# Patient Record
Sex: MALE | Race: WHITE | Employment: OTHER | URBAN - METROPOLITAN AREA
[De-identification: names, ages, dates, MRNs, and addresses within clinical notes are randomized per-mention and may not be internally consistent; named-entity substitution may affect disease eponyms.]

---

## 2018-06-20 ENCOUNTER — TELEPHONE (OUTPATIENT)
Dept: ORTHOPEDIC SURGERY | Age: 53
End: 2018-06-20

## 2018-06-20 DIAGNOSIS — S69.91XA INJURY OF RIGHT HAND, INITIAL ENCOUNTER: Primary | ICD-10-CM

## 2018-06-25 ENCOUNTER — OFFICE VISIT (OUTPATIENT)
Dept: ORTHOPEDIC SURGERY | Age: 53
End: 2018-06-25
Payer: MEDICARE

## 2018-06-25 VITALS
SYSTOLIC BLOOD PRESSURE: 124 MMHG | RESPIRATION RATE: 22 BRPM | HEART RATE: 86 BPM | WEIGHT: 200 LBS | DIASTOLIC BLOOD PRESSURE: 84 MMHG | HEIGHT: 72 IN | BODY MASS INDEX: 27.09 KG/M2 | TEMPERATURE: 98.2 F

## 2018-06-25 DIAGNOSIS — M20.021 BOUTONNIERE DEFORMITY OF FINGER OF RIGHT HAND: Primary | ICD-10-CM

## 2018-06-25 PROCEDURE — 99203 OFFICE O/P NEW LOW 30 MIN: CPT | Performed by: ORTHOPAEDIC SURGERY

## 2018-06-25 PROCEDURE — 3017F COLORECTAL CA SCREEN DOC REV: CPT | Performed by: ORTHOPAEDIC SURGERY

## 2018-06-25 PROCEDURE — G8427 DOCREV CUR MEDS BY ELIG CLIN: HCPCS | Performed by: ORTHOPAEDIC SURGERY

## 2018-06-25 PROCEDURE — 1036F TOBACCO NON-USER: CPT | Performed by: ORTHOPAEDIC SURGERY

## 2018-06-25 PROCEDURE — G8419 CALC BMI OUT NRM PARAM NOF/U: HCPCS | Performed by: ORTHOPAEDIC SURGERY

## 2018-06-29 ENCOUNTER — TELEPHONE (OUTPATIENT)
Dept: PHYSICAL THERAPY | Age: 53
End: 2018-06-29

## 2018-07-02 ENCOUNTER — EVALUATION (OUTPATIENT)
Dept: OCCUPATIONAL THERAPY | Age: 53
End: 2018-07-02
Payer: MEDICARE

## 2018-07-02 DIAGNOSIS — M20.021 BOUTONNIERE DEFORMITY OF FINGER OF RIGHT HAND: Primary | ICD-10-CM

## 2018-07-02 PROCEDURE — 97165 OT EVAL LOW COMPLEX 30 MIN: CPT | Performed by: OCCUPATIONAL THERAPIST

## 2018-07-02 PROCEDURE — G8990 OTHER PT/OT CURRENT STATUS: HCPCS | Performed by: OCCUPATIONAL THERAPIST

## 2018-07-02 PROCEDURE — G8991 OTHER PT/OT GOAL STATUS: HCPCS | Performed by: OCCUPATIONAL THERAPIST

## 2018-07-02 NOTE — PROGRESS NOTES
Transportation: car/ drives gerald Castro  Leisure & Hobbies: none reported  Work: on disability     ADL  Feeding: Mod I  Grooming: Mod I  Bathing: Mod I  UE Dressing: Mod I  LE Dressing: Mod I  Toileting: Mod I  Transfer:  I- ambulates with cane    UE Assessment: Hand Dominance is R    R UE AROM: WFL except for the following:  RSF PIP extension: active lacks 65 degrees and passive lacks 50 degrees  RSF DIP flexion: active 0-18 degrees and passive 0-55  RSF DPC: WFL for long fist but lacks -1 cm to palmar crease    R UE strength: Exceptions to WNL     Right: 70# (norm 113#)              Right lateral  Pinch: 5.5# with pain (MP is enlarged)      Sensation: WFL    Tone: Normal    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     Circumferential Measurements: RSF   MP-PIP= 6.4 cm              PIP-DIP= 6.2 cm    R Hand Coordination: WFL    Assessment of current deficits   Functional mobility []  ADLs [] Strength [x]  Cognition []  Functional transfers  [] IADLs [] Safety Awareness []  Endurance []  Fine Motor Coordination [] Balance [] Vision/perception [] Sensation []   Gross Motor Coordination [] ROM [x]  Work []  Leisure[]     Eval Complexity: Low  Profile and History- interview, MD notes  Assessment of Occupational Performance and Identification of Deficits- 2 performance deficits  Clinical Decision Making- no modification required due to the RSF deficit/ co-morbidity affecting stats is left sided weakness/ spastivity    Rehab Potential:   [] Good  [x] Fair  [] Poor   Suggested Professional Referral: [x] No  [] Yes:  Barriers to Goal Achievement[de-identified]   [x] No  [] Yes:  Domestic Concerns:     [x] No  [] Yes:    Goal Formulation: Patient \" To be happy\"  Time In: 1300  Time Out: 1400  Timed Code Treatment Minutes: 60      PLAN     Plan   Plan: Plan of care initiated. Frequency Pt will be seen 2x a week for 5-6 weeks or 10-12 sessions as needed.     Treatment to include:   [x] Instruction in HEP   Modalities:  [x] understanding of HEP/Ed previously given. Patient understands diagnosis/prognosis and consents to treatment, plan and goals: [x] Yes    [] No     Electronically signed by: Wes Burnett OT/JAI  [id number]      [id number] Certification / Comments     Frequency/Duration 2 / week for 12 visits. Certification period From: 7-2-18  To: 8-13-18    I have reviewed the Plan of Care established for skilled therapy services and certify that the services are required and that they will be provided while the patient is under my care. Physician's Comments/Revisions:         Physician's Printed Name:           Dr Lucia Crawford                                Physician's Signature:                                                               Date:       Please review Patient's OT evaluation and if you agree sign/date and fax back to us at our [facility] fax # [phone].

## 2018-07-03 PROBLEM — M20.021 BOUTONNIERE DEFORMITY OF FINGER OF RIGHT HAND: Status: ACTIVE | Noted: 2018-07-03

## 2018-07-06 ENCOUNTER — TREATMENT (OUTPATIENT)
Dept: OCCUPATIONAL THERAPY | Age: 53
End: 2018-07-06
Payer: MEDICARE

## 2018-07-06 DIAGNOSIS — M20.021 BOUTONNIERE DEFORMITY OF FINGER OF RIGHT HAND: Primary | ICD-10-CM

## 2018-07-06 PROCEDURE — 97140 MANUAL THERAPY 1/> REGIONS: CPT | Performed by: OCCUPATIONAL THERAPIST

## 2018-07-06 PROCEDURE — 97110 THERAPEUTIC EXERCISES: CPT | Performed by: OCCUPATIONAL THERAPIST

## 2018-07-06 PROCEDURE — 97760 ORTHOTIC MGMT&TRAING 1ST ENC: CPT | Performed by: OCCUPATIONAL THERAPIST

## 2018-07-09 ENCOUNTER — TREATMENT (OUTPATIENT)
Dept: OCCUPATIONAL THERAPY | Age: 53
End: 2018-07-09
Payer: MEDICARE

## 2018-07-09 DIAGNOSIS — M20.021 BOUTONNIERE DEFORMITY OF FINGER OF RIGHT HAND: Primary | ICD-10-CM

## 2018-07-09 PROCEDURE — 97760 ORTHOTIC MGMT&TRAING 1ST ENC: CPT | Performed by: OCCUPATIONAL THERAPIST

## 2018-07-09 PROCEDURE — 97110 THERAPEUTIC EXERCISES: CPT | Performed by: OCCUPATIONAL THERAPIST

## 2018-07-09 PROCEDURE — 97140 MANUAL THERAPY 1/> REGIONS: CPT | Performed by: OCCUPATIONAL THERAPIST

## 2018-07-11 ENCOUNTER — TREATMENT (OUTPATIENT)
Dept: OCCUPATIONAL THERAPY | Age: 53
End: 2018-07-11
Payer: MEDICARE

## 2018-07-11 DIAGNOSIS — M20.021 BOUTONNIERE DEFORMITY OF FINGER OF RIGHT HAND: Primary | ICD-10-CM

## 2018-07-11 PROCEDURE — 97140 MANUAL THERAPY 1/> REGIONS: CPT | Performed by: OCCUPATIONAL THERAPIST

## 2018-07-11 PROCEDURE — 97110 THERAPEUTIC EXERCISES: CPT | Performed by: OCCUPATIONAL THERAPIST

## 2018-07-17 ENCOUNTER — TREATMENT (OUTPATIENT)
Dept: OCCUPATIONAL THERAPY | Age: 53
End: 2018-07-17
Payer: MEDICARE

## 2018-07-17 DIAGNOSIS — M20.021 BOUTONNIERE DEFORMITY OF FINGER OF RIGHT HAND: Primary | ICD-10-CM

## 2018-07-17 PROCEDURE — 97760 ORTHOTIC MGMT&TRAING 1ST ENC: CPT | Performed by: OCCUPATIONAL THERAPIST

## 2018-07-17 PROCEDURE — 97140 MANUAL THERAPY 1/> REGIONS: CPT | Performed by: OCCUPATIONAL THERAPIST

## 2018-07-17 PROCEDURE — 97110 THERAPEUTIC EXERCISES: CPT | Performed by: OCCUPATIONAL THERAPIST

## 2018-07-20 ENCOUNTER — TREATMENT (OUTPATIENT)
Dept: OCCUPATIONAL THERAPY | Age: 53
End: 2018-07-20
Payer: MEDICARE

## 2018-07-20 DIAGNOSIS — M20.021 BOUTONNIERE DEFORMITY OF FINGER OF RIGHT HAND: Primary | ICD-10-CM

## 2018-07-20 PROCEDURE — 97110 THERAPEUTIC EXERCISES: CPT | Performed by: OCCUPATIONAL THERAPIST

## 2018-07-20 PROCEDURE — 97140 MANUAL THERAPY 1/> REGIONS: CPT | Performed by: OCCUPATIONAL THERAPIST

## 2018-07-22 NOTE — PROGRESS NOTES
Strengthening:               Other:                 Assessment/Comments: Pt is making Fair + progress toward stated plan of care. PROM for R small finger extension has worsened at -40 today. Pt states he has been taking the brace off more. Composite flexion is much better at [facility]. -Rehab Potential: Good  -Requires OT Follow Up: Yes  Time In: 1130        Time Out: 1210        Visit #: 6    Treatment Charges: Mins Units   Modalities: MH 5    Ther Exercise 25 2   Manual Therapy 10 1   Thera Activities     ADL/Home Mgt      Neuro Re-education     Group Therapy     Non-Billable Service Time     Other: splinting     Total Time/Units 40 4     -Response to Treatment:Slow progress continues. Will advance into strengthening and into a LMB splint over the next few session. Tx to continue  For 3 more visits due to pt limitations in home programming due to left hemiparesis and living alone. Goals: Goals for pt can be seen on initial eval occurring on 7-2-18    Plan:   [x]  Continue Plan of care: Pt education continues at each visit to obtain maximum benefits from skilled OT intervention.   []  [facility] of care:   []  Discharge:      Shirlene Ayers OT/L [id number]

## 2018-07-25 ENCOUNTER — TREATMENT (OUTPATIENT)
Dept: OCCUPATIONAL THERAPY | Age: 53
End: 2018-07-25
Payer: MEDICARE

## 2018-07-25 DIAGNOSIS — M20.021 BOUTONNIERE DEFORMITY OF FINGER OF RIGHT HAND: Primary | ICD-10-CM

## 2018-07-25 PROCEDURE — 97110 THERAPEUTIC EXERCISES: CPT | Performed by: OCCUPATIONAL THERAPY ASSISTANT

## 2018-07-25 PROCEDURE — 97140 MANUAL THERAPY 1/> REGIONS: CPT | Performed by: OCCUPATIONAL THERAPY ASSISTANT

## 2018-07-25 NOTE — PROGRESS NOTES
OCCUPATIONAL THERAPY PROGRESS NOTE    Date:  18 Initial Evaluation Date: 18    Patient Name:  Georgine Habermann    :  1965  Restrictions/Precautions: Activity as tolerated, Mild fall risk  Diagnosis:  Right small finger boutonniere deformity                                                             Insurance/Certification information:  Medicare  Referring Physician:  Dr. Lucia Crawford, Arielle Robb [address] [place] [address]  Date of Surgery/Injury: approx 5 months ago  Plan of care signed (Y/N):  Y  Visit# / total visits:    Pain Level: 2 on scale of 1-10, uncomfortable in the right small finger  Subjective: \"I wore my splint for 7 days straight\". Objective:  Updated POC to be completed by visit 10. INTERVENTION: COMPLETED: SPECIFICS/COMMENTS:   Splinting:     Serial splinting R small finger  Pt fit with a custom circumfrential splint to promote a gentle prolonged stretch to the RSF PIP. Digit placed on a gentle stretch during splinting. Material used is light and able to be removed by pt  if needed. Splint removed today/ minor pressure point noted on the dorsal PIP- resolved 15 min after removal   Trial LMB extension splint  Small LMB extension splint attempted x 5 min- increased discoloration present on the dorsal PIP. Will hold on providing at this point. Tube splint application  Pt fit with a new small tube splint to further advance extension at the PIP of the small finger. Flexible metal stay replaced with firm thermoplastic stay. Pt also fit with another new brace so he can keep consistency by using one / cleaning one. AROM:     Blocking ex RSF x DIP and PIP    Curls with dowel x Some assistance required to keep the small finger curled. Better movement at the DIP.     AAROM:     Place and hold RSF x Focus on composite flexion        PROM/Stretching:     PROM and stretches RSF x         Manual techniques:      MFR RSF volar  Jt mobilization/ gentle RSF x         Strengthening: Other:                 Assessment/Comments: Pt is making Fair + progress toward stated plan of care. PROM for R small finger extension has worsened at -40 today. Pt states he has been taking the brace off more. Composite flexion is much better at [facility]. -Rehab Potential: Good  -Requires OT Follow Up: Yes  Time In: 1300        Time Out: 1345       Visit #:7    Treatment Charges: Mins Units   Modalities: MH 5    Ther Exercise 25 2   Manual Therapy 15 1   Thera Activities     ADL/Home Mgt      Neuro Re-education     Group Therapy     Non-Billable Service Time     Other: splinting     Total Time/Units 45 3     -Response to Treatment:Slow progress continues. Will advance into strengthening and into a LMB splint over the next few session. Tx to continue  For 3 more visits due to pt limitations in home programming due to left hemiparesis and living alone. Goals: Goals for pt can be seen on initial eval occurring on 7-2-18    Plan:   [x]  Continue Plan of care: Pt education continues at each visit to obtain maximum benefits from skilled OT intervention.   []  [facility] of care:   []  Discharge:      Andres DURHAM/L [id number]

## 2018-07-26 ENCOUNTER — OFFICE VISIT (OUTPATIENT)
Dept: ORTHOPEDIC SURGERY | Age: 53
End: 2018-07-26
Payer: MEDICARE

## 2018-07-26 VITALS
WEIGHT: 195 LBS | HEIGHT: 72 IN | TEMPERATURE: 98 F | HEART RATE: 84 BPM | SYSTOLIC BLOOD PRESSURE: 127 MMHG | DIASTOLIC BLOOD PRESSURE: 85 MMHG | RESPIRATION RATE: 20 BRPM | BODY MASS INDEX: 26.41 KG/M2

## 2018-07-26 DIAGNOSIS — M20.021 BOUTONNIERE DEFORMITY OF FINGER OF RIGHT HAND: Primary | ICD-10-CM

## 2018-07-26 PROCEDURE — 1036F TOBACCO NON-USER: CPT | Performed by: ORTHOPAEDIC SURGERY

## 2018-07-26 PROCEDURE — G8427 DOCREV CUR MEDS BY ELIG CLIN: HCPCS | Performed by: ORTHOPAEDIC SURGERY

## 2018-07-26 PROCEDURE — 3017F COLORECTAL CA SCREEN DOC REV: CPT | Performed by: ORTHOPAEDIC SURGERY

## 2018-07-26 PROCEDURE — G8419 CALC BMI OUT NRM PARAM NOF/U: HCPCS | Performed by: ORTHOPAEDIC SURGERY

## 2018-07-26 PROCEDURE — 99212 OFFICE O/P EST SF 10 MIN: CPT | Performed by: ORTHOPAEDIC SURGERY

## 2018-07-26 RX ORDER — CEPHALEXIN 500 MG/1
500 CAPSULE ORAL 2 TIMES DAILY
Refills: 0 | COMMUNITY
Start: 2018-07-22 | End: 2018-10-03 | Stop reason: CLARIF

## 2018-07-26 NOTE — PROGRESS NOTES
regards to his left ring finger, discussed it does not look overtly infected today. He has an ingrown nail. Discussed not to trim his nails to short. Okay for soaks. Can follow up after therapy. I have seen and evaluated the patient and agree with the above assessment and plan on today's visit. I have performed the key components of the history and physical examination with significant findings of Improving boutonniere deformity right small finger continue therapy. Follow-up as needed. I concur with the findings and plan as documented.     Dorinda Sheppard MD  7/26/2018

## 2018-08-01 ENCOUNTER — EVALUATION (OUTPATIENT)
Dept: PHYSICAL THERAPY | Age: 53
End: 2018-08-01
Payer: MEDICARE

## 2018-08-01 ENCOUNTER — TREATMENT (OUTPATIENT)
Dept: OCCUPATIONAL THERAPY | Age: 53
End: 2018-08-01
Payer: MEDICARE

## 2018-08-01 DIAGNOSIS — I69.30 HISTORY OF CEREBROVASCULAR ACCIDENT (CVA) WITH RESIDUAL DEFICIT: Primary | ICD-10-CM

## 2018-08-01 DIAGNOSIS — M20.021 BOUTONNIERE DEFORMITY OF FINGER OF RIGHT HAND: Primary | ICD-10-CM

## 2018-08-01 PROCEDURE — 97110 THERAPEUTIC EXERCISES: CPT | Performed by: PHYSICAL THERAPIST

## 2018-08-01 PROCEDURE — 97116 GAIT TRAINING THERAPY: CPT | Performed by: PHYSICAL THERAPIST

## 2018-08-01 PROCEDURE — G8978 MOBILITY CURRENT STATUS: HCPCS | Performed by: PHYSICAL THERAPIST

## 2018-08-01 PROCEDURE — 97140 MANUAL THERAPY 1/> REGIONS: CPT | Performed by: OCCUPATIONAL THERAPIST

## 2018-08-01 PROCEDURE — G8979 MOBILITY GOAL STATUS: HCPCS | Performed by: PHYSICAL THERAPIST

## 2018-08-01 PROCEDURE — 97530 THERAPEUTIC ACTIVITIES: CPT | Performed by: PHYSICAL THERAPIST

## 2018-08-01 PROCEDURE — 97110 THERAPEUTIC EXERCISES: CPT | Performed by: OCCUPATIONAL THERAPIST

## 2018-08-01 PROCEDURE — 97112 NEUROMUSCULAR REEDUCATION: CPT | Performed by: PHYSICAL THERAPIST

## 2018-08-01 PROCEDURE — 97163 PT EVAL HIGH COMPLEX 45 MIN: CPT | Performed by: PHYSICAL THERAPIST

## 2018-08-01 PROCEDURE — 97760 ORTHOTIC MGMT&TRAING 1ST ENC: CPT | Performed by: OCCUPATIONAL THERAPIST

## 2018-08-01 NOTE — PROGRESS NOTES
OCCUPATIONAL THERAPY PROGRESS NOTE    Date:  18 Initial Evaluation Date: 18    Patient Name:  Hayden Adams    :  1965  Restrictions/Precautions: Activity as tolerated, Mild fall risk  Diagnosis:  Right small finger boutonniere deformity                                                             Insurance/Certification information:  Medicare  Referring Physician:  Dr. Jules Hare, Arielle Robb [address] [place] [address]  Date of Surgery/Injury: approx 5 months ago  Plan of care signed (Y/N):  Y  Visit# / total visits:    Pain Level: 1-2 on scale of 1-10, uncomfortable in the right small finger  Subjective: \"I want my finger straight so I wear the splint all the time\". Objective:  Updated POC to be completed by visit 10. INTERVENTION: COMPLETED: SPECIFICS/COMMENTS:   Splinting:     Serial splinting R small finger  Pt fit with a custom circumfrential splint to promote a gentle prolonged stretch to the RSF PIP. Digit placed on a gentle stretch during splinting. Material used is light and able to be removed by pt  if needed. Splint removed today/ minor pressure point noted on the dorsal PIP- resolved 15 min after removal   Trial LMB extension splint x Small LMB extension splint attempted x 5 min- less discoloration present on the dorsal PIP. Will slowly transition into home use. Pt is able to john and doff I. Tube splint application Continue use Pt fit with a new small tube splint to further advance extension at the PIP of the small finger. Flexible metal stay replaced with firm thermoplastic stay. Pt also fit with another new brace so he can keep consistency by using one / cleaning one. AROM:     Blocking ex RSF x DIP and PIP    Curls with dowel x Some assistance required to keep the small finger curled. Better movement at the DIP.     AAROM:     Place and hold RSF x Focus on composite flexion        PROM/Stretching:     PROM and stretches RSF x         Manual techniques:      MFR RSF volar  Jt mobilization/ gentle RSF x         Strengthening:     Putty ex- med soft blend x Gripping, rolling for digit extension- helpful for loosening the RSF PIP joint. Pt to continue this exercise daily at home. Other:                 Assessment/Comments: Pt is making Fair + progress toward stated plan of care. Pt cautioned about wearing his finger braces all day. The need to balance with hand use discussed. Theraputty ex integrated into his home program to promote active ROM and improved strength. AROM in the affected PIP is [place] [place] for flexion and -70 degrees for extension. PROM at the affected PIP is -35 degrees extension.     -Rehab Potential: Good  -Requires OT Follow Up: Yes  Time In: 1250        Time Out: 1345       Visit #:8    Treatment Charges: Mins Units   Modalities: MH 5    Ther Exercise 25 2   Manual Therapy 10 1   Thera Activities     ADL/Home Mgt      Neuro Re-education     Group Therapy     Non-Billable Service Time     Other: splinting 15 1   Total Time/Units 55 4     -Response to Treatment:Slow progress continues. . Tx to continue for 1 more visit due to pt limitations in home programming due to left hemiparesis and living alone. Goals: Goals for pt can be seen on initial eval occurring on 7-2-18    Plan:   [x]  Continue Plan of care: Pt education continues at each visit to obtain maximum benefits from skilled OT intervention.   []  [address] [facility] of care:   []  Discharge:      Joss Marie OT/L  [id number]

## 2018-08-01 NOTE — PROGRESS NOTES
Demonstrates/verbalizes understanding of HEP/Ed previously given. Frequency:  2-3 times per week for 4-6 weeks    Patient understands diagnosis/prognosis and consents to treatment, plan and goals: [x] Yes    [] No     Thank you for the opportunity to work with your patient. If you have questions or comments, please contact me at [phone]; fax: [phone]. Electronically signed by: Mercedez Baez PT         Please sign Physician's Certification and return to: [place] [id number]  Dept: [phone]  Dept Fax: [phone] Certification / Comments     Frequency/Duration 2-3 / week for 4-6 weeks. Certification period From: 8-1-18  To: 9-30-18    I have reviewed the Plan of Care established for skilled therapy services and certify that the services are required and that they will be provided while the patient is under my care.     Physician's Comments/Revisions:               Physician's Printed Name:                                           [de-identified] Signature:                                                               Date:

## 2018-08-03 ENCOUNTER — TREATMENT (OUTPATIENT)
Dept: PHYSICAL THERAPY | Age: 53
End: 2018-08-03
Payer: MEDICARE

## 2018-08-03 ENCOUNTER — TREATMENT (OUTPATIENT)
Dept: OCCUPATIONAL THERAPY | Age: 53
End: 2018-08-03
Payer: MEDICARE

## 2018-08-03 DIAGNOSIS — M20.021 BOUTONNIERE DEFORMITY OF FINGER OF RIGHT HAND: Primary | ICD-10-CM

## 2018-08-03 DIAGNOSIS — I69.30 HISTORY OF CEREBROVASCULAR ACCIDENT (CVA) WITH RESIDUAL DEFICIT: Primary | ICD-10-CM

## 2018-08-03 PROCEDURE — 97110 THERAPEUTIC EXERCISES: CPT | Performed by: OCCUPATIONAL THERAPIST

## 2018-08-03 PROCEDURE — 97110 THERAPEUTIC EXERCISES: CPT | Performed by: PHYSICAL THERAPIST

## 2018-08-03 PROCEDURE — 97140 MANUAL THERAPY 1/> REGIONS: CPT | Performed by: OCCUPATIONAL THERAPIST

## 2018-08-03 NOTE — PROGRESS NOTES
OCCUPATIONAL THERAPY PROGRESS NOTE    Date:  18 Initial Evaluation Date: 18    Patient Name:  Marquis Bullard    :  1965  Restrictions/Precautions: Activity as tolerated, Mild fall risk  Diagnosis:  Right small finger boutonniere deformity                                                             Insurance/Certification information:  Medicare  Referring Physician:  Dr. Jennifer Morin, Arielle Robb [address] [place] [address]  Date of Surgery/Injury: approx 5 months ago  Plan of care signed (Y/N):  Y  Visit# / total visits:    Pain Level: 1-2 on scale of 1-10, uncomfortable in the right small finger  Subjective: \"I want my finger straight so I wear the splint all the time\". Objective:  Updated POC to be completed by visit 10. INTERVENTION: COMPLETED: SPECIFICS/COMMENTS:   Splinting:     Serial splinting R small finger  Pt fit with a custom circumfrential splint to promote a gentle prolonged stretch to the RSF PIP. Digit placed on a gentle stretch during splinting. Material used is light and able to be removed by pt  if needed. Splint removed today/ minor pressure point noted on the dorsal PIP- resolved 15 min after removal    LMB extension splint x Small LMB extension splint  x 5 min and 2x/ day Will increase as tolerated. Pt is able to john and doff I. Tube splint application Continue use Pt fit with a new small tube splint to further advance extension at the PIP of the small finger. Flexible metal stay replaced with firm thermoplastic stay. Pt also fit with another new brace so he can keep consistency by using one / cleaning one. AROM:     Blocking ex RSF x DIP and PIP   Towel exercise x Focus on finger flexion and extension    Curls with dowel x Some assistance required to keep the small finger curled. Better movement at the DIP.     AAROM:     Place and hold RSF x Focus on composite flexion        PROM/Stretching:     PROM and stretches RSF x         Manual techniques:      MFR RSF volar  Jt mobilization/ gentle RSF x         Strengthening:     Putty ex- med soft blend x Gripping, rolling for digit extension- helpful for loosening the RSF PIP joint. Pt to continue this exercise daily at home. Other:                 Assessment/Comments: Pt is making Fair + progress toward stated plan of care. Pt is wearing his splint less with good results. The LMB is being used at 5 min 2x/ day, and the tube splint for nighttime / occasionally during the day. PROM for extension has improved to -25. Compliance with home putty ex is good as well.     -Rehab Potential: Good  -Requires OT Follow Up: Yes  Time In: 1250        Time Out: 1340      Visit #:9    Treatment Charges: Mins Units   Modalities: MH 5    Ther Exercise 30 2   Manual Therapy 15 1   Thera Activities     ADL/Home Mgt      Neuro Re-education     Group Therapy     Non-Billable Service Time     Other: splinting     Total Time/Units 50 3     -Response to Treatment: Slow progress continues. Overall flexibility in the affected finger is better today with less swelling. Therapist agreed to continue OT for 2 more weeks at 1x/ week unless plateau in progress  is noted. Goals: Goals for pt can be seen on initial eval occurring on 7-2-18    Plan:   [x]  Continue Plan of care: Pt education continues at each visit to obtain maximum benefits from skilled OT intervention.   []  [facility] of care:   []  Discharge:      Juan C Recinos OT/L  [id number]

## 2018-08-06 ENCOUNTER — TREATMENT (OUTPATIENT)
Dept: PHYSICAL THERAPY | Age: 53
End: 2018-08-06
Payer: MEDICARE

## 2018-08-06 DIAGNOSIS — I69.30 HISTORY OF CEREBROVASCULAR ACCIDENT (CVA) WITH RESIDUAL DEFICIT: Primary | ICD-10-CM

## 2018-08-06 PROCEDURE — 97110 THERAPEUTIC EXERCISES: CPT | Performed by: PHYSICAL THERAPIST

## 2018-08-08 ENCOUNTER — TREATMENT (OUTPATIENT)
Dept: PHYSICAL THERAPY | Age: 53
End: 2018-08-08
Payer: MEDICARE

## 2018-08-08 ENCOUNTER — TREATMENT (OUTPATIENT)
Dept: OCCUPATIONAL THERAPY | Age: 53
End: 2018-08-08
Payer: MEDICARE

## 2018-08-08 DIAGNOSIS — M20.021 BOUTONNIERE DEFORMITY OF FINGER OF RIGHT HAND: Primary | ICD-10-CM

## 2018-08-08 DIAGNOSIS — I69.30 HISTORY OF CEREBROVASCULAR ACCIDENT (CVA) WITH RESIDUAL DEFICIT: Primary | ICD-10-CM

## 2018-08-08 PROCEDURE — 97110 THERAPEUTIC EXERCISES: CPT

## 2018-08-08 PROCEDURE — G8990 OTHER PT/OT CURRENT STATUS: HCPCS | Performed by: OCCUPATIONAL THERAPIST

## 2018-08-08 PROCEDURE — 97112 NEUROMUSCULAR REEDUCATION: CPT

## 2018-08-08 PROCEDURE — 97110 THERAPEUTIC EXERCISES: CPT | Performed by: OCCUPATIONAL THERAPIST

## 2018-08-08 PROCEDURE — 97140 MANUAL THERAPY 1/> REGIONS: CPT | Performed by: OCCUPATIONAL THERAPIST

## 2018-08-08 PROCEDURE — G8991 OTHER PT/OT GOAL STATUS: HCPCS | Performed by: OCCUPATIONAL THERAPIST

## 2018-08-08 NOTE — PROGRESS NOTES
OCCUPATIONAL THERAPY PROGRESS NOTE    Date:  18 Initial Evaluation Date: 18    Patient Name:  Marquis Bullard    :  1965  Restrictions/Precautions: Activity as tolerated, Mild fall risk  Diagnosis:  Right small finger boutonniere deformity                                                             Insurance/Certification information:  Medicare  Referring Physician:  Dr. Jennifer Morin, Arielle Robb [address] [place] [address]  Date of Surgery/Injury: approx 5 months ago  Plan of care signed (Y/N):  Y  Visit# / total visits:10 / 12    Pain Level: 1-2 on scale of 1-10, uncomfortable in the right small finger  Subjective: \"I want my finger straight so I wear the splint all the time\". Objective:  Updated POC to be completed by visit 10. INTERVENTION: COMPLETED: SPECIFICS/COMMENTS:   Splinting:     Serial splinting R small finger  Pt fit with a custom circumfrential splint to promote a gentle prolonged stretch to the RSF PIP. Digit placed on a gentle stretch during splinting. Material used is light and able to be removed by pt  if needed. Splint removed today/ minor pressure point noted on the dorsal PIP- resolved 15 min after removal    LMB extension splint x Small LMB extension splint  x 5 min and 2x/ day Will increase as tolerated. Pt is able to john and doff I. Tube splint application Continue use  4 hours day/ night Pt fit with a new small tube splint to further advance extension at the PIP of the small finger. Flexible metal stay replaced with firm thermoplastic stay. Pt also fit with another new brace so he can keep consistency by using one / cleaning one. AROM:     Blocking ex RSF x DIP and PIP   Towel exercise  Focus on finger flexion and extension    Curls with dowel x Less assistance required to keep the small finger curled. Better movement at the DIP.     AAROM:     Place and hold RSF x Focus on composite flexion        PROM/Stretching:     PROM and stretches RSF x         Manual techniques:      MFR RSF volar  Jt mobilization/ gentle RSF x         Strengthening:     Putty ex- med soft blend x Gripping, rolling for digit extension- helpful for loosening the RSF PIP joint. Pt to continue this exercise daily at home. Other:                 Assessment/Comments: Pt is making Fair + progress toward stated plan of care. -Rehab Potential: Good  -Requires OT Follow Up: Yes  Time In: 1300      Time Out: 1350      Visit # 10    Treatment Charges: Mins Units   Modalities: MH 5    Ther Exercise 30 2   Manual Therapy 15 1   Thera Activities     ADL/Home Mgt      Neuro Re-education     Group Therapy     Non-Billable Service Time     Other: splinting     Total Time/Units 50 3     -Response to Treatment: Slow progress continues. Therapist agreed to continue OT for 1-2 more weeks at 1x/ week unless plateau in progress  is noted. G Code: AROM RSF (% of normal)  Adm: CL (76% impairment)  Current: CK (50%)  Goal: CJ (20-40%)   Goals: Goals for pt can be seen on initial eval occurring on 7-2-18    Plan:   [x]  Continue Plan of care: Pt education continues at each visit to obtain maximum benefits from skilled OT intervention.   []  [facility] of care:   []  Discharge:      Dailey Re OT/L  [id number]

## 2018-08-09 NOTE — PROGRESS NOTES
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 Related impairments: ADL's  Social: lives alone in an apartment, drives a handicapped [place] [employer]  Chief complaint: walking worsened 10/18, states this was a gradual process with stiffening of the left side                  Outcome Measure:      S: no new complaints  O:  Time [id number]     Visit 4     Pain 0/10     ROM      Modalities            Exercise      Nustep   10 min           Squats      Calf Raises 3 x 15            Marching 3 x 15     Alt. Sidekicks 3 x 15     Step up 3 x 5     Sit/Stands            Gait training       NMR Balance activities to aid in safe community and home ambulation    Marching Gait      Sidestepping      Retrowalk      Heel to toe      March on BOSU                  A:  Tolerated well. Above added to written HEP.   P: Continue with rehab plan  Jone Patterson, PT    Treatment Charges: Mins Units   Initial Evaluation     Re-Evaluation     Ther Exercise         TE 55 4   Manual Therapy     MT     Ther Activities        TA     Gait Training          GT     Neuro Re-education NR     Modalities     Non-Billable Service Time     Other     Total Time/Units 55 4

## 2018-08-13 ENCOUNTER — TREATMENT (OUTPATIENT)
Dept: PHYSICAL THERAPY | Age: 53
End: 2018-08-13
Payer: MEDICARE

## 2018-08-13 DIAGNOSIS — I69.30 HISTORY OF CEREBROVASCULAR ACCIDENT (CVA) WITH RESIDUAL DEFICIT: Primary | ICD-10-CM

## 2018-08-13 PROCEDURE — 97112 NEUROMUSCULAR REEDUCATION: CPT

## 2018-08-13 PROCEDURE — 97110 THERAPEUTIC EXERCISES: CPT

## 2018-08-15 ENCOUNTER — TREATMENT (OUTPATIENT)
Dept: OCCUPATIONAL THERAPY | Age: 53
End: 2018-08-15
Payer: MEDICARE

## 2018-08-15 ENCOUNTER — TREATMENT (OUTPATIENT)
Dept: PHYSICAL THERAPY | Age: 53
End: 2018-08-15
Payer: MEDICARE

## 2018-08-15 DIAGNOSIS — I69.30 HISTORY OF CEREBROVASCULAR ACCIDENT (CVA) WITH RESIDUAL DEFICIT: Primary | ICD-10-CM

## 2018-08-15 DIAGNOSIS — M20.021 BOUTONNIERE DEFORMITY OF FINGER OF RIGHT HAND: Primary | ICD-10-CM

## 2018-08-15 PROCEDURE — 97140 MANUAL THERAPY 1/> REGIONS: CPT | Performed by: OCCUPATIONAL THERAPIST

## 2018-08-15 PROCEDURE — G8991 OTHER PT/OT GOAL STATUS: HCPCS | Performed by: OCCUPATIONAL THERAPIST

## 2018-08-15 PROCEDURE — 97112 NEUROMUSCULAR REEDUCATION: CPT

## 2018-08-15 PROCEDURE — 97110 THERAPEUTIC EXERCISES: CPT | Performed by: OCCUPATIONAL THERAPIST

## 2018-08-15 PROCEDURE — 97110 THERAPEUTIC EXERCISES: CPT

## 2018-08-15 PROCEDURE — G8992 OTHER PT/OT  D/C STATUS: HCPCS | Performed by: OCCUPATIONAL THERAPIST

## 2018-08-15 NOTE — PROGRESS NOTES
techniques:      MFR RSF volar  Jt mobilization/ gentle RSF x         Strengthening:     Putty ex- med soft blend x Gripping, rolling for digit extension- helpful for loosening the RSF PIP joint. Pt to continue this exercise daily at home. Other:                 Assessment/Comments: Pt is making Fair + progress toward stated plan of care. AROM in the RSF is WNL for active finger flexion. Residual limitations are present for PIP extension only. (PIP active extension -65 degrees and PROM -30 degrees) This is a plateau in ROM progress. GOALS (Long term same as Short term):  1) Patient will demonstrate good understanding of home program(exercises/activities/diagnosis/prognosis/goals) with good accuracy. ( goal met for ROM and hand strengthening)    2) Patient will demonstrate increased active/passive range of motion of their R small finger to [facility] for ADL/IADL completion.(goal met)  AROM in the R hand is [employer] [facility] [place] with residual extension limitations at the RSF PIP joint. 3) Patient will demonstrate increased /pinch strength of at least 10 / 5 pinch pounds of their R hand. (progress noted)  R hand strength is [employer] [facility] [place] for daily activity. 4) Patient to report decreased pain in their affected R hand from 4/10 to 1/10 or less with resistive functional use. (max progress)    5) Patient to report 100% compliance with their splint wear, care, and precautions if needed. (progresss noted)  Splint use is >70% compliance.     6) Prevent further deformity and maximize ROM in the R small finger (goal met)    -Rehab Potential: Good  -Requires OT Follow Up: Yes  Time In: 1305      Time Out: 1355     Visit # 10    Treatment Charges: Mins Units   Modalities: MH 5    Ther Exercise 30 2   Manual Therapy 15 1   Thera Activities     ADL/Home Mgt      Neuro Re-education     Group Therapy     Non-Billable Service Time     Other: splinting     Total Time/Units 50 3     G Code: AROM RSF (% of normal)  Adm: CL (76% impairment)  Discharge:  CK (50%)  Goal: CJ (20-40%)   Goals: Goals for pt can be seen on initial eval occurring on 7-2-18    Plan:   []  Continue Plan of care: Pt education continues at each visit to obtain maximum benefits from skilled OT intervention.   []  Alter Plan of care:   [x]  Discharge:Due to plateau in progress/ pt to continue splint use      Sesar Meyer OT/L  [id number]

## 2018-08-15 NOTE — PROGRESS NOTES
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 Related impairments: ADL's  Social: lives alone in an apartment, drives a handicapped Jessica Zarco  Chief complaint: walking worsened 10/18, states this was a gradual process with stiffening of the left side                  Outcome Measure:      S:  Pt reports doing ok. Continues to be sore and tired but feels good  O:  Time 4596-4292     Visit 6     Pain 0/10     ROM      Modalities            Exercise      Nustep   L5/ 10 min  TE         Squats      Calf Raises 3 x 15   Circuit NM          Marching 3 x 15       \" NM   Alt. Sidekicks 3 x 15       \" NM   Step up 8\" 2 x 10  NM   Sit/Stands 10x, 15x  1 hand to stand, no hand to sit down NM   Leg press 40#  40x  NM   Leg press       SL 0#   7x, 9x, 10x Support left leg NM   Gait training       NMR Balance activities to aid in safe community and home ambulation    Marching Gait      Sidestepping      Retrowalk      Heel to toe      March on BOSU                  A:  Tolerated well. Above added to written HEP.   P: Continue with rehab plan  Chas Antonio PTA    Treatment Charges: Mins Units   Initial Evaluation     Re-Evaluation     Ther Exercise         TE 15 1   Manual Therapy     MT     Ther Activities        TA     Gait Training          GT     Neuro Re-education NR 45 3   Modalities     Non-Billable Service Time     Other     Total Time/Units 60 4

## 2018-08-20 ENCOUNTER — TREATMENT (OUTPATIENT)
Dept: PHYSICAL THERAPY | Age: 53
End: 2018-08-20
Payer: MEDICARE

## 2018-08-20 DIAGNOSIS — I69.30 HISTORY OF CEREBROVASCULAR ACCIDENT (CVA) WITH RESIDUAL DEFICIT: Primary | ICD-10-CM

## 2018-08-20 PROCEDURE — 97110 THERAPEUTIC EXERCISES: CPT | Performed by: PHYSICAL THERAPIST

## 2018-08-20 PROCEDURE — 97112 NEUROMUSCULAR REEDUCATION: CPT | Performed by: PHYSICAL THERAPIST

## 2018-08-20 NOTE — PROGRESS NOTES
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 Related impairments: ADL's  Social: lives alone in an apartment, drives a handicapped [employer]  Chief complaint: walking worsened 10/18, states this was a gradual process with stiffening of the left side                  Outcome Measure:      S:  Pt reports he was able to walk 7 laps in [place] w/ minimal difficulty. States he could do 4 laps before w/ marked difficulty   O:  Time 4679-7304     Visit 8     Pain 0/10     ROM      Modalities            Exercise      Nustep   L5/ 10 min  TE         Squats      Calf Raises 3 x 15   Circuit NM          Marching 3 x 15       \" NM   Alt. Sidekicks 3 x 15       \" NM   Step up 8\" 2 x 10  NM   Sit/Stands 10x, 15x  1 hand to stand, no hand to sit down NM   Leg press 60#  2 x 20  NM   Leg press       SL 20#   2 x 10 Support left leg NM   Gait training       NMR Balance activities to aid in safe community and home ambulation    Marching Gait      Sidestepping      Retrowalk      Heel to toe      March on BOSU                  A:  Tolerated well. Above added to written HEP.   P: Continue with rehab plan  Vidal Grider PT    Treatment Charges: Mins Units   Initial Evaluation     Re-Evaluation     Ther Exercise         TE 15 1   Manual Therapy     MT     Ther Activities        TA     Gait Training          GT     Neuro Re-education NR 40 3   Modalities     Non-Billable Service Time     Other     Total Time/Units 55 4

## 2018-08-22 ENCOUNTER — TREATMENT (OUTPATIENT)
Dept: PHYSICAL THERAPY | Age: 53
End: 2018-08-22
Payer: MEDICARE

## 2018-08-22 ENCOUNTER — EVALUATION (OUTPATIENT)
Dept: OCCUPATIONAL THERAPY | Age: 53
End: 2018-08-22
Payer: MEDICARE

## 2018-08-22 DIAGNOSIS — I69.359 CVA, OLD, HEMIPARESIS (HCC): Primary | ICD-10-CM

## 2018-08-22 DIAGNOSIS — I69.30 HISTORY OF CEREBROVASCULAR ACCIDENT (CVA) WITH RESIDUAL DEFICIT: Primary | ICD-10-CM

## 2018-08-22 PROCEDURE — 97112 NEUROMUSCULAR REEDUCATION: CPT

## 2018-08-22 PROCEDURE — 97110 THERAPEUTIC EXERCISES: CPT

## 2018-08-22 PROCEDURE — G8991 OTHER PT/OT GOAL STATUS: HCPCS | Performed by: OCCUPATIONAL THERAPIST

## 2018-08-22 PROCEDURE — 97167 OT EVAL HIGH COMPLEX 60 MIN: CPT | Performed by: OCCUPATIONAL THERAPIST

## 2018-08-22 PROCEDURE — G8990 OTHER PT/OT CURRENT STATUS: HCPCS | Performed by: OCCUPATIONAL THERAPIST

## 2018-08-22 NOTE — PROGRESS NOTES
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 Related impairments: ADL's  Social: lives alone in an apartment, drives a handicapped [employer]  Chief complaint: walking worsened 10/18, states this was a gradual process with stiffening of the left side                  Outcome Measure:      S:  Pt reports doing ok. Continues to be sore and tired but feels good  O:  Time 6425-9129     Visit 8     Pain 0/10     ROM      Modalities            Exercise      Nustep   L5/ 10 min  TE         Squats      Calf Raises 3 x 15   Circuit     Airex NM          Marching 3 x 15       \" NM   Alt. Sidekicks 3 x 15       \" NM   Step up   NM   Sit/Stands 15x  1 hand to stand, no hand to sit down NM   Leg press 40#  40x  NM   Leg press       SL 20# 2 x 10 Support left leg NM   Gait training       NMR Balance activities to aid in safe community and home ambulation    Marching Gait      Sidestepping      Retrowalk      Heel to toe      March on BOSU                  A:  Tolerated well. Above added to written HEP.   P: Continue with rehab plan  Blanka Hurst PTA    Treatment Charges: Mins Units   Initial Evaluation     Re-Evaluation     Ther Exercise         TE 15 1   Manual Therapy     MT     Ther Activities        TA     Gait Training          GT     Neuro Re-education NR 45 3   Modalities     Non-Billable Service Time     Other     Total Time/Units 60 4

## 2018-08-23 PROBLEM — I69.359 CVA, OLD, HEMIPARESIS (HCC): Status: ACTIVE | Noted: 2018-08-23

## 2018-08-23 NOTE — PROGRESS NOTES
OCCUPATIONAL THERAPY INITIAL EVALUATION    Phone: [phone]  Fax: [phone]     Date:  2018  Initial Evaluation Date: 18    Patient Name:  Tres Hampton    :  1965    Restrictions/Precautions: Activity as tolerated, Mild fall risk  Diagnosis:  CVA with left sided weakness       Insurance/Certification information:  Medicare  Referring Physician:  Dr Arlene Holguin, [address] [place] [address]  Date of Surgery/Injury: CVA 2011  Plan of care signed (Y/N):  N  Visit# / total visits:     Past Medical History:   Past Medical History:   Diagnosis Date    Blindness     right eye    Diverticulitis 2016    Hyperlipidemia     Hypertension     Stroke [facility])     left sided weakness     Past Surgical History:   Past Surgical History:   Procedure Laterality Date    BRAIN SURGERY      removed part of bone    CHOLECYSTECTOMY  2013    Laparoscopic     Reason for Referral: patient presents for outpatient Occupational therapy following CVA in . Pt states following his CVA he went to [place] for rehabilitation for about 6 months. Since that time, he hasn't had any more therapy. His main concerns today are progressive deformity in the left arm and decreased ability to use the arm as an assist.   Comments: At rest, patient holds the left arm in a flexor synergy with extreme wrist flexion. The states he has a resting splint for the hand/ wrist, but states it doesn't fit any more. Home Living: lives alone in apartment with 6-10 step entry. He has a hand held shower, tub seat, tub grabbars, [address], quad cane and power chair.    Prior Level of Function: Independent    Pain Level: moderate, squeezing, tight (pulling) and uncomfortable in the left shoulder    Cognition:   Alert/Oriented x3     IADL History  Homemaking Responsibility: I  Shopping Responsibility: I  Mode of Transportation: car  Leisure & Hobbies: none reported  Work: / on disability now     ADL ( all CN  Goal: CL    Patient. Education:  [x] Plans/Goals, Risks/Benefits discussed  [] Home exercise program  Method of Education: [x] Verbal  [] Demo  [] Written  Comprehension of Education:  [x] Verbalizes understanding. [] Demonstrates understanding. [] Needs Review. [] Demonstrates/verbalizes understanding of HEP/Ed previously given. Patient understands diagnosis/prognosis and consents to treatment, plan and goals: [x] Yes    [] No     Electronically signed by: Maria Elena Forbes OT/L  [id number]      [id number] Certification / Comments     Frequency/Duration 2 / week for 12 visits. Certification period From: 8-23-18  To: 10-4-18    I have reviewed the Plan of Care established for skilled therapy services and certify that the services are required and that they will be provided while the patient is under my care. Physician's Comments/Revisions:         Physician's Printed Name:       Dr Cathy Aburto                                    Physician's Signature:                                                               Date:       Please review Patient's OT evaluation and if you agree sign/date and fax back to us at our [facility] fax # [phone].

## 2018-08-29 ENCOUNTER — TREATMENT (OUTPATIENT)
Dept: PHYSICAL THERAPY | Age: 53
End: 2018-08-29
Payer: MEDICARE

## 2018-08-29 ENCOUNTER — TREATMENT (OUTPATIENT)
Dept: OCCUPATIONAL THERAPY | Age: 53
End: 2018-08-29
Payer: MEDICARE

## 2018-08-29 DIAGNOSIS — I69.359 CVA, OLD, HEMIPARESIS (HCC): Primary | ICD-10-CM

## 2018-08-29 DIAGNOSIS — I69.30 HISTORY OF CEREBROVASCULAR ACCIDENT (CVA) WITH RESIDUAL DEFICIT: Primary | ICD-10-CM

## 2018-08-29 PROCEDURE — 97110 THERAPEUTIC EXERCISES: CPT | Performed by: OCCUPATIONAL THERAPIST

## 2018-08-29 PROCEDURE — 97110 THERAPEUTIC EXERCISES: CPT

## 2018-08-29 PROCEDURE — 97112 NEUROMUSCULAR REEDUCATION: CPT

## 2018-08-29 PROCEDURE — 97112 NEUROMUSCULAR REEDUCATION: CPT | Performed by: OCCUPATIONAL THERAPIST

## 2018-08-29 NOTE — PROGRESS NOTES
OCCUPATIONAL THERAPY PROGRESS NOTE    Date:  2018  Initial Evaluation Date: 18    Patient Name:  Ana Carlson    :  1965  Restrictions/Precautions: Activity as tolerated, Mild fall risk  Diagnosis:  CVA with left sided weakness                                                      Insurance/Certification information:  Medicare  Referring Physician:  Dr Jael Parikh, [address] [place] [address]  Date of Surgery/Injury: CVA 2011  Plan of care signed (Y/N):  N  Visit# / total visits:     Pain Level: 5 on scale of 1-10, tight (pulling) and uncomfortable  In the L shoulder  Subjective: The patient presents for Occupational Therapy with no new complaints. Objective:  Updated POC to be completed by visit 10. INTERVENTION: COMPLETED: SPECIFICS/COMMENTS:   Modality:     MH x L shld in conjunction with ROM to the L distal extremity 10 min  Tolerated well        AROM/ AAROM     Tenodesis x Very challenging/ encouraged for home   Shld/ scapular AROM x    L UE reach x Requires moderate effort        PROM/Stretching:     PROM L UE including scapula to tolerance x Guarded of PROM- c/o pulling sensation with shld ROM- full digit extension noted with wrist flexion but compromised with wrist in neutral        Neuro- muscular Re-education     Postural control x Focus on maintaining posture with ROM ex/ requires tactile and verbal cues   Inhibition techniques x  to decrease spasticity in the left shld , elbow and wrist   Strengthening:               Other:                 Assessment/Comments: Pt is making Fair progress toward stated plan of care.    -Rehab Potential: Good  -Requires OT Follow Up: Yes  Time In:1300            Time Out: 1400             Visit #: 2    Treatment Charges: Mins Units   Modalities     Ther Exercise 40 3   Manual Therapy     Thera Activities     ADL/Home Mgt      Neuro Re-education 20 1   Group Therapy     Non-Billable Service Time     Other     Total Time/Units 60

## 2018-08-29 NOTE — PROGRESS NOTES
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 Related impairments: ADL's  Social: lives alone in an apartment, drives a handicapped [employer]  Chief complaint: walking worsened 10/18, states this was a gradual process with stiffening of the left side                  Outcome Measure:      S:  Pt reports doing ok. Continues to be sore and tired but feels good  O:  Time 0428-5321     Visit 9     Pain 0/10     ROM      Modalities            Exercise      Nustep   L5/ 15 min  TE         Squats      Calf Raises   Circuit     Airex NM         Marching       \" NM   Alt. Sidekicks       \" NM   Step up   NM   Sit/Stands 20x  1 hand to stand, no hand to sit down NM   Leg press 40#  20x  NM   Leg press       SL 20# 2 x 20 Support left leg NM   Gait training       NMR Balance activities to aid in safe community and home ambulation    Marching Gait      Sidestepping      Retrowalk      Heel to toe      March on BOSU            Leg ext SL 5#  2 x 10  B LE 10# 2 x 10                       A:  Tolerated well.     P: Continue with rehab plan  Annette Culp PTA    Treatment Charges: Mins Units   Initial Evaluation     Re-Evaluation     Ther Exercise         TE 15 1   Manual Therapy     MT     Ther Activities        TA     Gait Training          GT     Neuro Re-education NR 45 3   Modalities     Non-Billable Service Time     Other     Total Time/Units 60 4

## 2018-08-31 ENCOUNTER — TREATMENT (OUTPATIENT)
Dept: OCCUPATIONAL THERAPY | Age: 53
End: 2018-08-31
Payer: MEDICARE

## 2018-08-31 ENCOUNTER — TREATMENT (OUTPATIENT)
Dept: PHYSICAL THERAPY | Age: 53
End: 2018-08-31
Payer: MEDICARE

## 2018-08-31 DIAGNOSIS — M20.021 BOUTONNIERE DEFORMITY OF FINGER OF RIGHT HAND: ICD-10-CM

## 2018-08-31 DIAGNOSIS — I69.359 CVA, OLD, HEMIPARESIS (HCC): Primary | ICD-10-CM

## 2018-08-31 DIAGNOSIS — I69.30 HISTORY OF CEREBROVASCULAR ACCIDENT (CVA) WITH RESIDUAL DEFICIT: Primary | ICD-10-CM

## 2018-08-31 PROCEDURE — 97110 THERAPEUTIC EXERCISES: CPT

## 2018-08-31 PROCEDURE — 97110 THERAPEUTIC EXERCISES: CPT | Performed by: OCCUPATIONAL THERAPIST

## 2018-08-31 PROCEDURE — 97112 NEUROMUSCULAR REEDUCATION: CPT | Performed by: OCCUPATIONAL THERAPIST

## 2018-08-31 PROCEDURE — 97112 NEUROMUSCULAR REEDUCATION: CPT

## 2018-08-31 NOTE — PROGRESS NOTES
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 Related impairments: ADL's  Social: lives alone in an apartment, drives a handicapped [employer]  Chief complaint: walking worsened 10/18, states this was a gradual process with stiffening of the left side                  Outcome Measure:Functional Limitation: Mobility: Walking and moving around  Mobility: Walking and Moving Around Current Status (): At least 40 percent but less than 60 percent impaired, limited or restricted  Mobility: Walking and Moving Around Goal Status (): At least 20 percent but less than 40 percent impaired, limited or restricted      S:  Pt reports doing ok. Continues to be sore and tired but feels good   O:  Time 8662-4740     Visit 10     Pain 0/10     ROM      Modalities            Exercise      Nustep   L5/ 15 min  TE         Squats      Calf Raises   Circuit     Airex NM         Marching       \" NM   Alt. Sidekicks       \" NM   Step up   NM   Sit/Stands 20x  1 hand to stand, no hand to sit down NM   Leg press 40#  20x  NM   Leg press       SL 20# 2 x 20 Support left leg NM   Gait training       NMR Balance activities to aid in safe community and home ambulation    Marching Gait      Sidestepping      Retrowalk      Heel to toe      March on BOSU            Leg ext SL 5#  2 x 10  B LE 10# 2 x 10                       A:  Tolerated well.     P: Continue with rehab plan  Brien Tay PTA    Treatment Charges: Mins Units   Initial Evaluation     Re-Evaluation     Ther Exercise         TE 15 1   Manual Therapy     MT     Ther Activities        TA     Gait Training GT     Neuro Re-education NR 45 3   Modalities     Non-Billable Service Time     Other     Total Time/Units 60 4

## 2018-09-05 ENCOUNTER — TREATMENT (OUTPATIENT)
Dept: OCCUPATIONAL THERAPY | Age: 53
End: 2018-09-05
Payer: MEDICARE

## 2018-09-05 ENCOUNTER — TREATMENT (OUTPATIENT)
Dept: PHYSICAL THERAPY | Age: 53
End: 2018-09-05
Payer: MEDICARE

## 2018-09-05 DIAGNOSIS — I69.359 CVA, OLD, HEMIPARESIS (HCC): Primary | ICD-10-CM

## 2018-09-05 DIAGNOSIS — I69.30 HISTORY OF CEREBROVASCULAR ACCIDENT (CVA) WITH RESIDUAL DEFICIT: Primary | ICD-10-CM

## 2018-09-05 PROCEDURE — 97112 NEUROMUSCULAR REEDUCATION: CPT

## 2018-09-05 PROCEDURE — 97110 THERAPEUTIC EXERCISES: CPT | Performed by: OCCUPATIONAL THERAPIST

## 2018-09-05 PROCEDURE — 97112 NEUROMUSCULAR REEDUCATION: CPT | Performed by: OCCUPATIONAL THERAPIST

## 2018-09-05 PROCEDURE — 97110 THERAPEUTIC EXERCISES: CPT

## 2018-09-05 NOTE — PROGRESS NOTES
Potential: Good  -Requires OT Follow Up: Yes  Time In:1300           Time Out: 1400            Visit #: 3    Treatment Charges: Mins Units   Modalities     Ther Exercise 30 2   Manual Therapy     Thera Activities     ADL/Home Mgt      Neuro Re-education 30 2   Group Therapy     Non-Billable Service Time     Other     Total Time/Units 60 4     -Response to Treatment: Tolerance for activity is better today than his last session. Intermittent left arm spasticity remains an issue, especially in the wrist flexors and elbow flexors. Time taken to discuss the possibility of a physiatry consult for potential botox injections with Dr Katarzyna Florez. Pt states he will think about it. Wrist race tolerated well for 1 1/2 hours. Will advance in splint tolerance as pt can tolerate. Goals: Goals for pt can be seen on initial eval occurring on 8-22-18    Plan:   [x]  Continue Plan of care: Pt education continues at each visit to obtain maximum benefits from skilled OT intervention.   []  [address] of care:   []  Discharge:      [address] OT/L   [id number]

## 2018-09-07 ENCOUNTER — TREATMENT (OUTPATIENT)
Dept: PHYSICAL THERAPY | Age: 53
End: 2018-09-07
Payer: MEDICARE

## 2018-09-07 ENCOUNTER — TREATMENT (OUTPATIENT)
Dept: OCCUPATIONAL THERAPY | Age: 53
End: 2018-09-07
Payer: MEDICARE

## 2018-09-07 DIAGNOSIS — M20.021 BOUTONNIERE DEFORMITY OF FINGER OF RIGHT HAND: Primary | ICD-10-CM

## 2018-09-07 DIAGNOSIS — I69.30 HISTORY OF CEREBROVASCULAR ACCIDENT (CVA) WITH RESIDUAL DEFICIT: Primary | ICD-10-CM

## 2018-09-07 PROCEDURE — 97110 THERAPEUTIC EXERCISES: CPT | Performed by: OCCUPATIONAL THERAPIST

## 2018-09-07 PROCEDURE — 97112 NEUROMUSCULAR REEDUCATION: CPT | Performed by: OCCUPATIONAL THERAPIST

## 2018-09-07 PROCEDURE — 97110 THERAPEUTIC EXERCISES: CPT

## 2018-09-07 PROCEDURE — 97112 NEUROMUSCULAR REEDUCATION: CPT

## 2018-09-07 NOTE — PROGRESS NOTES
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 Related impairments: ADL's  Social: lives alone in an apartment, drives a handicapped [employer]  Chief complaint: walking worsened 10/18, states this was a gradual process with stiffening of the left side                  Outcome Measure:Functional Limitation: Mobility: Walking and moving around  Mobility: Walking and Moving Around Current Status (): At least 40 percent but less than 60 percent impaired, limited or restricted  Mobility: Walking and Moving Around Goal Status (): At least 20 percent but less than 40 percent impaired, limited or restricted      S:  Pt reports doing ok. O:  Time 8537-4005     Visit 12     Pain 0/10     ROM      Modalities            Exercise      Nustep   L5/ 15 min  TE         Squats      Calf Raises 3 x 15   Circuit     Airex NM          Marching 3 x 15       \" NM   Alt. Sidekicks 3 x 15       \" NM   Step up   NM   Sit/Stands 20x  1 hand to stand, no hand to sit down NM   Leg press 40#  2 x 20  NM   Leg press       SL 20# 2 x 20 Support left leg NM   Gait training       NMR Balance activities to aid in safe community and home ambulation    Marching Gait      Sidestepping      Retrowalk      Heel to toe      March on BOSU            Leg ext B LE 10# 2 x 10  SL 5#  2 x 10                       A:  Tolerated well.   Pt was able to perform 1 sit to stand with no arm and 10% asst from PTA  P: Continue with rehab plan  Megan Reilly PTA    Treatment Charges: Mins Units   Initial Evaluation     Re-Evaluation     Ther Exercise         TE 15 1   Manual Therapy     MT Ther Activities        TA     Gait Training          GT     Neuro Re-education NR 45 3   Modalities     Non-Billable Service Time     Other     Total Time/Units 60 4

## 2018-09-12 ENCOUNTER — TELEPHONE (OUTPATIENT)
Dept: PHYSICAL MEDICINE AND REHAB | Age: 53
End: 2018-09-12

## 2018-09-12 ENCOUNTER — TREATMENT (OUTPATIENT)
Dept: PHYSICAL THERAPY | Age: 53
End: 2018-09-12
Payer: MEDICARE

## 2018-09-12 ENCOUNTER — TREATMENT (OUTPATIENT)
Dept: OCCUPATIONAL THERAPY | Age: 53
End: 2018-09-12
Payer: MEDICARE

## 2018-09-12 DIAGNOSIS — I69.359 CVA, OLD, HEMIPARESIS (HCC): Primary | ICD-10-CM

## 2018-09-12 DIAGNOSIS — I69.30 HISTORY OF CEREBROVASCULAR ACCIDENT (CVA) WITH RESIDUAL DEFICIT: Primary | ICD-10-CM

## 2018-09-12 PROCEDURE — 97530 THERAPEUTIC ACTIVITIES: CPT | Performed by: OCCUPATIONAL THERAPIST

## 2018-09-12 PROCEDURE — 97110 THERAPEUTIC EXERCISES: CPT

## 2018-09-12 PROCEDURE — 97112 NEUROMUSCULAR REEDUCATION: CPT

## 2018-09-12 PROCEDURE — 97112 NEUROMUSCULAR REEDUCATION: CPT | Performed by: OCCUPATIONAL THERAPIST

## 2018-09-12 PROCEDURE — 97110 THERAPEUTIC EXERCISES: CPT | Performed by: OCCUPATIONAL THERAPIST

## 2018-09-12 NOTE — PROGRESS NOTES
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 Related impairments: ADL's  Social: lives alone in an apartment, drives a handicapped [employer]  Chief complaint: walking worsened 10/18, states this was a gradual process with stiffening of the left side                  Outcome Measure:Functional Limitation: Mobility: Walking and moving around  Mobility: Walking and Moving Around Current Status (): At least 40 percent but less than 60 percent impaired, limited or restricted  Mobility: Walking and Moving Around Goal Status (): At least 20 percent but less than 40 percent impaired, limited or restricted      S:  Pt reports doing ok. O:  Time 1933-9432     Visit 13     Pain 0/10     ROM      Modalities            Exercise      Nustep   L5/ 15 min  TE         Squats      Calf Raises 3 x 15   Circuit     Airex NM          Marching 3 x 15       \" NM   Alt. Sidekicks 3 x 15       \" NM   Step up 8\" 2 x 10  NM   Sit/Stands 20x/ 4 min  1 hand to stand, no hand to sit down. SBA/CGA hold chair NM   Leg press 40#  2 x 20  NM   Leg press       SL 20# 2 x 20 Support left leg NM   Gait training       NMR Balance activities to aid in safe community and home ambulation    Marching Gait      Sidestepping      Retrowalk      Heel to toe      March on BOSU            Leg ext                       A:  Tolerated well.     P: Continue with rehab plan  Rinku Lowery PTA    Treatment Charges: Mins Units   Initial Evaluation     Re-Evaluation     Ther Exercise         TE 15 1   Manual Therapy     MT     Ther Activities        TA     Gait Training          GT Neuro Re-education NR 45 3   Modalities     Non-Billable Service Time     Other     Total Time/Units 60 4

## 2018-09-14 ENCOUNTER — TREATMENT (OUTPATIENT)
Dept: OCCUPATIONAL THERAPY | Age: 53
End: 2018-09-14
Payer: MEDICARE

## 2018-09-14 ENCOUNTER — TELEPHONE (OUTPATIENT)
Dept: PHYSICAL MEDICINE AND REHAB | Age: 53
End: 2018-09-14

## 2018-09-14 ENCOUNTER — TREATMENT (OUTPATIENT)
Dept: PHYSICAL THERAPY | Age: 53
End: 2018-09-14
Payer: MEDICARE

## 2018-09-14 DIAGNOSIS — I69.359 CVA, OLD, HEMIPARESIS (HCC): Primary | ICD-10-CM

## 2018-09-14 DIAGNOSIS — I69.30 HISTORY OF CEREBROVASCULAR ACCIDENT (CVA) WITH RESIDUAL DEFICIT: Primary | ICD-10-CM

## 2018-09-14 PROCEDURE — 97110 THERAPEUTIC EXERCISES: CPT

## 2018-09-14 PROCEDURE — 97112 NEUROMUSCULAR REEDUCATION: CPT

## 2018-09-14 PROCEDURE — 97110 THERAPEUTIC EXERCISES: CPT | Performed by: OCCUPATIONAL THERAPIST

## 2018-09-14 PROCEDURE — 97112 NEUROMUSCULAR REEDUCATION: CPT | Performed by: OCCUPATIONAL THERAPIST

## 2018-09-14 NOTE — PROGRESS NOTES
OCCUPATIONAL THERAPY PROGRESS NOTE    Date:  2018  Initial Evaluation Date: 18    Patient Name:  Roula Ovalles    :  1965  Restrictions/Precautions: Activity as tolerated, Mild fall risk  Diagnosis:  CVA with left sided weakness                                                      Insurance/Certification information:  Medicare  Referring Physician:  Dr Connie Hearn, [address] [place] [address]  Date of Surgery/Injury: CVA 2011  Plan of care signed (Y/N):  N  Visit# / total visits:     Pain Level: 4 on scale of 1-10, tight (pulling) and uncomfortable  In the L elbow/ wrist  Subjective: \" My arm and leg are really tight today. \"   Objective:  Updated POC to be completed by visit 10. INTERVENTION: COMPLETED: SPECIFICS/COMMENTS:   Modality:     MH x L shld in conjunction with ROM to the L distal extremity 10 min  Tolerated well        AROM/ AAROM     Tenodesis  Challenging/ encouraged for home   Shld/ scapular AROM x shld elevation/ retraction/ rolls    L UE reach  Requires moderate effort and assistance for a good stretch   L UE AAROM all planes x Pt is less guarded with AAROM vs AROM   PROM/Stretching:     PROM L UE including scapula to tolerance x Completed as tolerated/ focus on the shld   SROM L UE  Use of R arm to pull left in horizontal ABD/ ADD   Therapeutic activity     L UE grasp/ release/ all planes             x Completed with sponges/ focus placed on patterning of movement and extinguishing abnormal patterns. Verbal cues and physical assistance required at times/ many breaks need due to spasticity in the wrist flexors and in the biceps             Neuro- muscular Re-education     Functional L UE grasp/ release (with wrist brace)  Tx initiated with the use of the L arm for light sponges and 2 inch blocks. . Verbal cues and physical prompts needed for patterning of movement. Pt able to grasp with the hand in pronation but not supination.  All ex completed with use of wrist brace. Postural control x Focus on maintaining posture with ROM ex/ requires tactile and verbal cues   Inhibition techniques x  to decrease spasticity in the left shld , elbow and wrist   Strengthening:               Other:                 Assessment/Comments: Pt is making Fair progress toward stated plan of care. Spasticity is considerably higher today making all L UE exercise more difficult.   -Rehab Potential: Good  -Requires OT Follow Up: Yes  Time In:1310          Time Out: 1400            Visit #: 7    Treatment Charges: Mins Units   Modalities     Ther Exercise 30 2   Manual Therapy     Thera Activities     ADL/Home Mgt      Neuro Re-education 20 1   Group Therapy     Non-Billable Service Time     Other     Total Time/Units 50 3     -Response to Treatment: Consultation with Dr Pamela Castaneda has been scheduled for early October. Progress in OT has been fluctuating due to fluctuating spasticity levels. Will continue to advance as tolerated. Goals: Goals for pt can be seen on initial eval occurring on 8-22-18    Plan:   [x]  Continue Plan of care: Pt education continues at each visit to obtain maximum benefits from skilled OT intervention.   []  [address] of care:   []  Discharge:      Fredo Gillis OT/L   [id number]

## 2018-09-14 NOTE — TELEPHONE ENCOUNTER
Called and left message on patient voicemail to call office to schedule an appointment. Will wait for return call from patient.

## 2018-09-14 NOTE — TELEPHONE ENCOUNTER
----- Message from Jose Grijalva sent at 9/14/2018  8:58 AM EDT -----  Please see script in epic for Dr Milton Valenzuela in epic for Botox due to spasticity

## 2018-09-14 NOTE — PROGRESS NOTES
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 Related impairments: ADL's  Social: lives alone in an apartment, drives a handicapped [employer]  Chief complaint: walking worsened 10/18, states this was a gradual process with stiffening of the left side                  Outcome Measure:Functional Limitation: Mobility: Walking and moving around  Mobility: Walking and Moving Around Current Status (): At least 40 percent but less than 60 percent impaired, limited or restricted  Mobility: Walking and Moving Around Goal Status (): At least 20 percent but less than 40 percent impaired, limited or restricted      S:  Pt reports being very stiff in L arm/leg and relates it to not sleeping well last night. O:  Time 9141-9688     Visit 14      Pain 0/10     ROM      Modalities            Exercise      Nustep   L5/ 15 min  TE         Squats      Calf Raises 3 x 15 NM          Marching 3 x 15       \" NM   Alt. Sidekicks 3 x 15       \" NM   Sit/Stands 20x/ 4 min  1 hand to stand, no hand to sit down. SBA/CGA hold chair NM   Leg press 40#  2 x 20  NM   Leg press       SL 20# 2 x 20 Support left leg NM   Gait training       NMR Balance activities to aid in safe community and home ambulation    Marching Gait      Sidestepping      Retrowalk      Heel to toe      March on BOSU            Leg ext                       A:  Tolerated well.     P: Continue with rehab plan  Gurpreet Molina PTA    Treatment Charges: Mins Units   Initial Evaluation     Re-Evaluation     Ther Exercise         TE 15 1   Manual Therapy     MT     Ther Activities        TA     Gait Training          GT     Neuro Re-education NR 40 3   Modalities     Non-Billable Service Time     Other     Total Time/Units 55 4

## 2018-09-19 ENCOUNTER — TREATMENT (OUTPATIENT)
Dept: PHYSICAL THERAPY | Age: 53
End: 2018-09-19
Payer: MEDICARE

## 2018-09-19 ENCOUNTER — TREATMENT (OUTPATIENT)
Dept: OCCUPATIONAL THERAPY | Age: 53
End: 2018-09-19
Payer: MEDICARE

## 2018-09-19 DIAGNOSIS — I69.30 HISTORY OF CEREBROVASCULAR ACCIDENT (CVA) WITH RESIDUAL DEFICIT: Primary | ICD-10-CM

## 2018-09-19 DIAGNOSIS — I69.359 CVA, OLD, HEMIPARESIS (HCC): Primary | ICD-10-CM

## 2018-09-19 PROCEDURE — 97112 NEUROMUSCULAR REEDUCATION: CPT

## 2018-09-19 PROCEDURE — 97110 THERAPEUTIC EXERCISES: CPT

## 2018-09-19 PROCEDURE — 97110 THERAPEUTIC EXERCISES: CPT | Performed by: OCCUPATIONAL THERAPIST

## 2018-09-19 PROCEDURE — 97112 NEUROMUSCULAR REEDUCATION: CPT | Performed by: OCCUPATIONAL THERAPIST

## 2018-09-19 NOTE — PROGRESS NOTES
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 Related impairments: ADL's  Social: lives alone in an apartment, drives a handicapped [employer]  Chief complaint: walking worsened 10/18, states this was a gradual process with stiffening of the left side                  Outcome Measure:Functional Limitation: Mobility: Walking and moving around  Mobility: Walking and Moving Around Current Status (): At least 40 percent but less than 60 percent impaired, limited or restricted  Mobility: Walking and Moving Around Goal Status (): At least 20 percent but less than 40 percent impaired, limited or restricted      S:  Pt reports being very stiff in L arm/leg and relates it to not sleeping well last night. O:  Time 4543-1672     Visit 15     Pain 0/10     ROM      Modalities            Exercise      Nustep   L5/ 15 min  TE         Squats      Calf Raises 3 x 15   Circuit     AirexNM          Marching 3 x 15       \" NM   Alt. Sidekicks 3 x 15       \" NM   Step up 8\" 20x Sit/Stands 20x/ 3.2 min  1 hand to stand, no hand to sit down. SBA/CGA hold chair NM   Leg press 40#  2 x 20  NM   Leg press       SL 20# 2 x 20 Support left leg NM   Gait training       NMR Balance activities to aid in safe community and home ambulation    Marching Gait      Sidestepping      Retrowalk      Heel to toe      March on BOSU            Leg ext                       A:  Tolerated well.     P: Continue with rehab plan  Annette Culp PTA    Treatment Charges: Mins Units   Initial Evaluation     Re-Evaluation     Ther Exercise         TE 20 1   Manual Therapy     MT     Ther

## 2018-09-20 NOTE — PROGRESS NOTES
OCCUPATIONAL THERAPY PROGRESS NOTE    Date:  2018  Initial Evaluation Date: 18    Patient Name:  Blair Reilly    :  1965  Restrictions/Precautions: Activity as tolerated, Mild fall risk  Diagnosis:  CVA with left sided weakness                                                      Insurance/Certification information:  Medicare  Referring Physician:  Dr Chica West, [address] [place] [address]  Date of Surgery/Injury: CVA 2011  Plan of care signed (Y/N):  N  Visit# / total visits:     Pain Level: 3 on scale of 1-10, tight (pulling) and uncomfortable  In the L elbow/ wrist  Subjective: \" I got more sleep so I wouldn't be as tight today\". Objective:  Updated POC to be completed by visit 10. INTERVENTION: COMPLETED: SPECIFICS/COMMENTS:   Modality:     MH x L shld in conjunction with ROM to the L distal extremity 10 min  Tolerated well        AROM/ AAROM     Tenodesis x Completed with assist and a prolonged stretch for finger extension. Shld/ scapular AROM x shld elevation/ retraction/ rolls    L UE reach x Requires moderate effort and assistance for a good stretch/ frequent short rests needed due to spasticity   L UE AAROM all planes x Pt is less guarded with AAROM vs AROM   PROM/Stretching:     PROM L UE including scapula to tolerance x Completed as tolerated/ focus on the shld   SROM L UE  Use of R arm to pull left in horizontal ABD/ ADD   Therapeutic activity     L UE grasp/ release/ all planes (with brace)             x Completed with sponges and clothes pins/ focus placed on patterning of movement and extinguishing abnormal patterns.  Verbal cues and physical assistance required at times/ many breaks need due to spasticity in the wrist flexors and in the biceps             Neuro- muscular Re-education     Postural control x Focus on maintaining posture with ROM ex/ requires tactile and verbal cues   Inhibition techniques x  to decrease spasticity in the left shld , elbow and wrist   Strengthening:               Other:                 Assessment/Comments: Pt is making Fair progress toward stated plan of care. Spasticity is better  today making all L UE exercise easier. Movement put of flexor synergy remains challenging but is slowly improving.    -Rehab Potential: Good  -Requires OT Follow Up: Yes  Time In:1300         Time Out: 1400            Visit #: 8    Treatment Charges: Mins Units   Modalities     Ther Exercise 40 3   Manual Therapy     Thera Activities     ADL/Home Mgt      Neuro Re-education 20 1   Group Therapy     Non-Billable Service Time     Other     Total Time/Units 60 4     -Response to Treatment: Consultation with Dr Varun Hyde has been scheduled for early October. Progress in OT has been fluctuating due to fluctuating spasticity levels. Tolerance for movement is better today. Will continue to advance as tolerated. Goals: Goals for pt can be seen on initial eval occurring on 8-22-18    Plan:   [x]  Continue Plan of care: Pt education continues at each visit to obtain maximum benefits from skilled OT intervention.   []  [address] of care:   []  Discharge:      Abbi Richards OT/L   [id number]

## 2018-09-26 ENCOUNTER — TREATMENT (OUTPATIENT)
Dept: OCCUPATIONAL THERAPY | Age: 53
End: 2018-09-26
Payer: MEDICARE

## 2018-09-26 ENCOUNTER — TREATMENT (OUTPATIENT)
Dept: PHYSICAL THERAPY | Age: 53
End: 2018-09-26
Payer: MEDICARE

## 2018-09-26 DIAGNOSIS — I69.359 CVA, OLD, HEMIPARESIS (HCC): Primary | ICD-10-CM

## 2018-09-26 DIAGNOSIS — I69.30 HISTORY OF CEREBROVASCULAR ACCIDENT (CVA) WITH RESIDUAL DEFICIT: Primary | ICD-10-CM

## 2018-09-26 PROCEDURE — 97110 THERAPEUTIC EXERCISES: CPT

## 2018-09-26 PROCEDURE — 97112 NEUROMUSCULAR REEDUCATION: CPT | Performed by: OCCUPATIONAL THERAPIST

## 2018-09-26 PROCEDURE — 97110 THERAPEUTIC EXERCISES: CPT | Performed by: OCCUPATIONAL THERAPIST

## 2018-09-26 PROCEDURE — 97112 NEUROMUSCULAR REEDUCATION: CPT

## 2018-09-26 NOTE — PROGRESS NOTES
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 Related impairments: ADL's  Social: lives alone in an apartment, drives a handicapped [employer]  Chief complaint: walking worsened 10/18, states this was a gradual process with stiffening of the left side                  Outcome Measure:Functional Limitation: Mobility: Walking and moving around  Mobility: Walking and Moving Around Current Status (): At least 40 percent but less than 60 percent impaired, limited or restricted  Mobility: Walking and Moving Around Goal Status (): At least 20 percent but less than 40 percent impaired, limited or restricted      S:  Pt reports being very stiff in L arm/leg and relates it to not sleeping well last night. O:  Time 9165-0953     Visit 16     Pain 0/10     ROM      Modalities            Exercise      Nustep   L5/ 15 min  TE         Squats      Calf Raises 3 x 15   Circuit     AirexNM          Marching 3 x 15       \" NM   Alt. Sidekicks 3 x 15       \" NM   Step up 8\" 20x Sit/Stands 20x/ 2.3 min  1 hand to stand, no hand to sit down. SBA/CGA hold chair NM   Leg press 40#  2 x 20  NM   Leg press       SL 20# 2 x 20 Support left leg NM   Gait training       NMR Balance activities to aid in safe community and home ambulation    Marching Gait      Sidestepping      Retrowalk      Heel to toe      March on BOSU            Leg ext                       A:  Tolerated well.     P: Continue with rehab plan  Twila Drake PTA    Treatment Charges: Mins Units   Initial Evaluation     Re-Evaluation     Ther Exercise         TE 20 1   Manual Therapy     MT     Ther Activities        TA     Gait Training          GT     Neuro Re-education NR 40 3   Modalities     Non-Billable Service Time     Other     Total Time/Units 60 4

## 2018-09-28 ENCOUNTER — TREATMENT (OUTPATIENT)
Dept: OCCUPATIONAL THERAPY | Age: 53
End: 2018-09-28
Payer: MEDICARE

## 2018-09-28 ENCOUNTER — TREATMENT (OUTPATIENT)
Dept: PHYSICAL THERAPY | Age: 53
End: 2018-09-28
Payer: MEDICARE

## 2018-09-28 DIAGNOSIS — I69.359 CVA, OLD, HEMIPARESIS (HCC): Primary | ICD-10-CM

## 2018-09-28 DIAGNOSIS — I69.30 HISTORY OF CEREBROVASCULAR ACCIDENT (CVA) WITH RESIDUAL DEFICIT: Primary | ICD-10-CM

## 2018-09-28 PROCEDURE — 97112 NEUROMUSCULAR REEDUCATION: CPT

## 2018-09-28 PROCEDURE — 97112 NEUROMUSCULAR REEDUCATION: CPT | Performed by: OCCUPATIONAL THERAPIST

## 2018-09-28 PROCEDURE — 97110 THERAPEUTIC EXERCISES: CPT

## 2018-09-28 PROCEDURE — 97110 THERAPEUTIC EXERCISES: CPT | Performed by: OCCUPATIONAL THERAPIST

## 2018-09-28 PROCEDURE — G8991 OTHER PT/OT GOAL STATUS: HCPCS | Performed by: OCCUPATIONAL THERAPIST

## 2018-09-28 PROCEDURE — G8990 OTHER PT/OT CURRENT STATUS: HCPCS | Performed by: OCCUPATIONAL THERAPIST

## 2018-09-28 NOTE — PROGRESS NOTES
Other     Total Time/Units 50 3     -Response to Treatment: Consultation with Dr Staci Saucedo has been scheduled for next week to see if he is a candidate for Botox injections. Progress in OT has been fluctuating due to fluctuating spasticity levels. The main muscles hindering his progress are the biceps and the wrist flexors. Therapist hopes a small dose of Botox may relax the arm enough to improve functional use of the arm/ hand and to allow him to consistently wear a wrist brace. However, we do not want to hinder his finger flexors that are working well. Plans are to place the pt on hold for now pending Dr Will Awad recommendations. Will then proceed as recommended. Goals: Goals for pt can be seen on initial eval occurring on 8-22-18    Plan:   [x]  Continue Plan of care: Pt education continues at each visit to obtain maximum benefits from skilled OT intervention.   []  [address] of care:   []  Discharge:      Harrison Lay OT/L   [id number]

## 2018-09-28 NOTE — PROGRESS NOTES
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 Related impairments: ADL's  Social: lives alone in an apartment, drives a handicapped [employer]  Chief complaint: walking worsened 10/18, states this was a gradual process with stiffening of the left side                  Outcome Measure:Functional Limitation: Mobility: Walking and moving around  Mobility: Walking and Moving Around Current Status (): At least 40 percent but less than 60 percent impaired, limited or restricted  Mobility: Walking and Moving Around Goal Status (): At least 20 percent but less than 40 percent impaired, limited or restricted      S:  Pt reports being very stiff in L arm/leg and relates it to not sleeping well last night. O:  Time 0936-3413     Visit 17     Pain 0/10     ROM      Modalities            Exercise      Nustep   L5/ 15 min  TE         Squats      Calf Raises 3 x 15   Circuit     AirexNM          Marching 3 x 15       \" NM   Alt. Sidekicks 3 x 15       \" NM   Step up 8\" 20x Sit/Stands 20x/ 2.3 min  1 hand to stand, no hand to sit down. SBA/CGA hold chair NM   Leg press 40#  2 x 20  NM   Leg press       SL 20# 2 x 20 Support left leg NM   Gait training       NMR Balance activities to aid in safe community and home ambulation    Marching Gait      Sidestepping      Retrowalk      Heel to toe      March on BOSU            Leg ext                       A:  Tolerated well.     P: Continue with rehab plan  Peter Cheema PTA    Treatment Charges: Mins Units   Initial Evaluation     Re-Evaluation     Ther Exercise         TE 20 1   Manual Therapy     MT     Ther

## 2018-10-01 ENCOUNTER — TREATMENT (OUTPATIENT)
Dept: PHYSICAL THERAPY | Age: 53
End: 2018-10-01
Payer: MEDICARE

## 2018-10-01 DIAGNOSIS — I69.30 HISTORY OF CEREBROVASCULAR ACCIDENT (CVA) WITH RESIDUAL DEFICIT: Primary | ICD-10-CM

## 2018-10-01 PROCEDURE — 97110 THERAPEUTIC EXERCISES: CPT

## 2018-10-01 PROCEDURE — 97112 NEUROMUSCULAR REEDUCATION: CPT

## 2018-10-01 NOTE — PROGRESS NOTES
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 Related impairments: ADL's  Social: lives alone in an apartment, drives a handicapped [employer]  Chief complaint: walking worsened 10/18, states this was a gradual process with stiffening of the left side                  Outcome Measure:Functional Limitation: Mobility: Walking and moving around  Mobility: Walking and Moving Around Current Status (): At least 40 percent but less than 60 percent impaired, limited or restricted  Mobility: Walking and Moving Around Goal Status (): At least 20 percent but less than 40 percent impaired, limited or restricted      S:  Pt reports being very stiff in L arm/leg and relates it to not sleeping well last night. O:  Time 9984-8847     Visit 18     Pain 0/10     ROM      Modalities            Exercise      Nustep   L5/ 15 min  TE         Squats      Calf Raises 3 x 15   Circuit     AirexNM          Marching 3 x 15       \" NM   Alt. Sidekicks 3 x 15       \" NM   Step up 8\" 20x Sit/Stands 20x/ 2.3 min  1 hand to stand, no hand to sit down. SBA/CGA hold chair NM   Leg press 40#  2 x 20  NM   Leg press       SL 20# 2 x 20 Support left leg NM   Gait training       NMR Balance activities to aid in safe community and home ambulation    Marching Gait      Sidestepping      Retrowalk      Heel to toe      March on BOSU            Leg ext                       A:  Tolerated well.     P: Continue with rehab plan  Darlin Pelayo PTA    Treatment Charges: Mins Units   Initial Evaluation     Re-Evaluation     Ther Exercise         TE 20 1   Manual Therapy     MT     Ther

## 2018-10-03 ENCOUNTER — OFFICE VISIT (OUTPATIENT)
Dept: PHYSICAL MEDICINE AND REHAB | Age: 53
End: 2018-10-03
Payer: MEDICARE

## 2018-10-03 ENCOUNTER — TREATMENT (OUTPATIENT)
Dept: PHYSICAL THERAPY | Age: 53
End: 2018-10-03
Payer: MEDICARE

## 2018-10-03 VITALS
HEART RATE: 74 BPM | SYSTOLIC BLOOD PRESSURE: 121 MMHG | WEIGHT: 194 LBS | BODY MASS INDEX: 26.28 KG/M2 | HEIGHT: 72 IN | DIASTOLIC BLOOD PRESSURE: 86 MMHG

## 2018-10-03 DIAGNOSIS — Z74.09 IMPAIRED MOBILITY AND ACTIVITIES OF DAILY LIVING: ICD-10-CM

## 2018-10-03 DIAGNOSIS — R25.2 SPASTICITY AS LATE EFFECT OF CEREBROVASCULAR ACCIDENT (CVA): ICD-10-CM

## 2018-10-03 DIAGNOSIS — Z78.9 IMPAIRED MOBILITY AND ACTIVITIES OF DAILY LIVING: ICD-10-CM

## 2018-10-03 DIAGNOSIS — I69.398 SPASTICITY AS LATE EFFECT OF CEREBROVASCULAR ACCIDENT (CVA): ICD-10-CM

## 2018-10-03 DIAGNOSIS — I69.359 CVA, OLD, HEMIPARESIS (HCC): Primary | ICD-10-CM

## 2018-10-03 DIAGNOSIS — I69.30 HISTORY OF CEREBROVASCULAR ACCIDENT (CVA) WITH RESIDUAL DEFICIT: Primary | ICD-10-CM

## 2018-10-03 PROCEDURE — 97112 NEUROMUSCULAR REEDUCATION: CPT

## 2018-10-03 PROCEDURE — 1036F TOBACCO NON-USER: CPT | Performed by: PHYSICAL MEDICINE & REHABILITATION

## 2018-10-03 PROCEDURE — 99204 OFFICE O/P NEW MOD 45 MIN: CPT | Performed by: PHYSICAL MEDICINE & REHABILITATION

## 2018-10-03 PROCEDURE — G8427 DOCREV CUR MEDS BY ELIG CLIN: HCPCS | Performed by: PHYSICAL MEDICINE & REHABILITATION

## 2018-10-03 PROCEDURE — 3017F COLORECTAL CA SCREEN DOC REV: CPT | Performed by: PHYSICAL MEDICINE & REHABILITATION

## 2018-10-03 PROCEDURE — G8419 CALC BMI OUT NRM PARAM NOF/U: HCPCS | Performed by: PHYSICAL MEDICINE & REHABILITATION

## 2018-10-03 PROCEDURE — G8484 FLU IMMUNIZE NO ADMIN: HCPCS | Performed by: PHYSICAL MEDICINE & REHABILITATION

## 2018-10-03 PROCEDURE — 97110 THERAPEUTIC EXERCISES: CPT

## 2018-10-03 RX ORDER — TIZANIDINE 4 MG/1
4 TABLET ORAL 2 TIMES DAILY
COMMUNITY

## 2018-10-05 ENCOUNTER — TREATMENT (OUTPATIENT)
Dept: PHYSICAL THERAPY | Age: 53
End: 2018-10-05
Payer: MEDICARE

## 2018-10-05 DIAGNOSIS — I69.30 HISTORY OF CEREBROVASCULAR ACCIDENT (CVA) WITH RESIDUAL DEFICIT: Primary | ICD-10-CM

## 2018-10-05 PROCEDURE — 97112 NEUROMUSCULAR REEDUCATION: CPT

## 2018-10-05 PROCEDURE — 97110 THERAPEUTIC EXERCISES: CPT

## 2018-10-08 ENCOUNTER — TREATMENT (OUTPATIENT)
Dept: PHYSICAL THERAPY | Age: 53
End: 2018-10-08
Payer: MEDICARE

## 2018-10-08 DIAGNOSIS — I69.30 HISTORY OF CEREBROVASCULAR ACCIDENT (CVA) WITH RESIDUAL DEFICIT: Primary | ICD-10-CM

## 2018-10-08 PROCEDURE — 97112 NEUROMUSCULAR REEDUCATION: CPT

## 2018-10-08 PROCEDURE — 97110 THERAPEUTIC EXERCISES: CPT

## 2018-10-08 NOTE — PROGRESS NOTES
Training          GT     Neuro Re-education NR 40 3   Modalities     Non-Billable Service Time     Other     Total Time/Units 60 4

## 2018-10-10 ENCOUNTER — TREATMENT (OUTPATIENT)
Dept: PHYSICAL THERAPY | Age: 53
End: 2018-10-10
Payer: MEDICARE

## 2018-10-10 DIAGNOSIS — I69.30 HISTORY OF CEREBROVASCULAR ACCIDENT (CVA) WITH RESIDUAL DEFICIT: Primary | ICD-10-CM

## 2018-10-10 PROCEDURE — 97112 NEUROMUSCULAR REEDUCATION: CPT

## 2018-10-10 PROCEDURE — 97110 THERAPEUTIC EXERCISES: CPT

## 2018-10-10 NOTE — PROGRESS NOTES
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 Related impairments: ADL's  Social: lives alone in an apartment, drives a handicapped [employer]  Chief complaint: walking worsened 10/18, states this was a gradual process with stiffening of the left side                  Outcome Measure:Functional Limitation: Mobility: Walking and moving around  Mobility: Walking and Moving Around Current Status (): At least 40 percent but less than 60 percent impaired, limited or restricted  Mobility: Walking and Moving Around Goal Status (): At least 20 percent but less than 40 percent impaired, limited or restricted      S:  Pt reports doing well  O:  Time 13:10-14:10     Visit 22     Pain 0/10     ROM      Modalities            Exercise      Nustep   L5/ 15 min  TE         Squats      Calf Raises 3 x 15   Circuit     AirexNM          Marching 3 x 15       \" NM   Alt. Sidekicks 3 x 15       \" NM   Step up 8\" 20x Sit/Stands 20x/ 2.3 min  1 hand to stand, no hand to sit down. SBA/CGA hold chair NM   Leg press 40#  2 x 20  NM   Leg press       SL 20# 2 x 20 Support left leg NM   Gait training       NMR Balance activities to aid in safe community and home ambulation    Marching Gait      Sidestepping      Retrowalk      Heel to toe      March on BOSU            Leg ext B LE 10# 2 x 10  SL 5#  2 x 10  NM                     A:  Tolerated well.     P: Continue with rehab plan  Talia Alonso PTA    Treatment Charges: Mins Units   Initial Evaluation     Re-Evaluation     Ther Exercise         TE 20 1   Manual Therapy     MT     Ther Activities        TA     Gait

## 2018-10-12 ENCOUNTER — TREATMENT (OUTPATIENT)
Dept: PHYSICAL THERAPY | Age: 53
End: 2018-10-12
Payer: MEDICARE

## 2018-10-12 DIAGNOSIS — I69.30 HISTORY OF CEREBROVASCULAR ACCIDENT (CVA) WITH RESIDUAL DEFICIT: Primary | ICD-10-CM

## 2018-10-12 PROCEDURE — 97112 NEUROMUSCULAR REEDUCATION: CPT

## 2018-10-12 PROCEDURE — 97110 THERAPEUTIC EXERCISES: CPT

## 2018-11-07 ENCOUNTER — TELEPHONE (OUTPATIENT)
Dept: PHYSICAL THERAPY | Age: 53
End: 2018-11-07

## 2018-11-09 ENCOUNTER — TELEPHONE (OUTPATIENT)
Dept: PHYSICAL MEDICINE AND REHAB | Age: 53
End: 2018-11-09

## 2018-11-28 ENCOUNTER — TELEPHONE (OUTPATIENT)
Dept: PHYSICAL MEDICINE AND REHAB | Age: 53
End: 2018-11-28

## 2018-11-28 NOTE — TELEPHONE ENCOUNTER
Called and spoke with [address] from [address] to verify shipping for Botox.   It is to be shipped before 12-10-18

## 2019-08-07 ENCOUNTER — OFFICE VISIT (OUTPATIENT)
Dept: PHYSICAL MEDICINE AND REHAB | Age: 54
End: 2019-08-07
Payer: MEDICARE

## 2019-08-07 VITALS
DIASTOLIC BLOOD PRESSURE: 82 MMHG | HEIGHT: 72 IN | WEIGHT: 203 LBS | HEART RATE: 75 BPM | BODY MASS INDEX: 27.5 KG/M2 | SYSTOLIC BLOOD PRESSURE: 120 MMHG

## 2019-08-07 DIAGNOSIS — Z78.9 IMPAIRED MOBILITY AND ACTIVITIES OF DAILY LIVING: ICD-10-CM

## 2019-08-07 DIAGNOSIS — Z74.09 IMPAIRED MOBILITY AND ACTIVITIES OF DAILY LIVING: ICD-10-CM

## 2019-08-07 DIAGNOSIS — I69.398 SPASTICITY AS LATE EFFECT OF CEREBROVASCULAR ACCIDENT (CVA): Primary | ICD-10-CM

## 2019-08-07 DIAGNOSIS — R25.2 SPASTICITY AS LATE EFFECT OF CEREBROVASCULAR ACCIDENT (CVA): Primary | ICD-10-CM

## 2019-08-07 PROCEDURE — 95874 GUIDE NERV DESTR NEEDLE EMG: CPT | Performed by: PHYSICAL MEDICINE & REHABILITATION

## 2019-08-07 PROCEDURE — 64644 CHEMODENERV 1 EXTREM 5/> MUS: CPT | Performed by: PHYSICAL MEDICINE & REHABILITATION

## 2019-08-07 PROCEDURE — 64645 CHEMODENERV 1 EXTREM 5/> EA: CPT | Performed by: PHYSICAL MEDICINE & REHABILITATION

## 2019-08-07 RX ORDER — FERROUS SULFATE 325(65) MG
325 TABLET ORAL
COMMUNITY

## 2019-09-18 ENCOUNTER — OFFICE VISIT (OUTPATIENT)
Dept: PHYSICAL MEDICINE AND REHAB | Age: 54
End: 2019-09-18
Payer: MEDICARE

## 2019-09-18 VITALS
SYSTOLIC BLOOD PRESSURE: 97 MMHG | HEART RATE: 81 BPM | DIASTOLIC BLOOD PRESSURE: 70 MMHG | BODY MASS INDEX: 26.82 KG/M2 | HEIGHT: 72 IN | WEIGHT: 198 LBS

## 2019-09-18 DIAGNOSIS — Z78.9 IMPAIRED MOBILITY AND ACTIVITIES OF DAILY LIVING: ICD-10-CM

## 2019-09-18 DIAGNOSIS — R25.2 SPASTICITY AS LATE EFFECT OF CEREBROVASCULAR ACCIDENT (CVA): Primary | ICD-10-CM

## 2019-09-18 DIAGNOSIS — I69.398 SPASTICITY AS LATE EFFECT OF CEREBROVASCULAR ACCIDENT (CVA): Primary | ICD-10-CM

## 2019-09-18 DIAGNOSIS — Z74.09 IMPAIRED MOBILITY AND ACTIVITIES OF DAILY LIVING: ICD-10-CM

## 2019-09-18 PROCEDURE — 3017F COLORECTAL CA SCREEN DOC REV: CPT | Performed by: PHYSICAL MEDICINE & REHABILITATION

## 2019-09-18 PROCEDURE — G8428 CUR MEDS NOT DOCUMENT: HCPCS | Performed by: PHYSICAL MEDICINE & REHABILITATION

## 2019-09-18 PROCEDURE — G8419 CALC BMI OUT NRM PARAM NOF/U: HCPCS | Performed by: PHYSICAL MEDICINE & REHABILITATION

## 2019-09-18 PROCEDURE — 1036F TOBACCO NON-USER: CPT | Performed by: PHYSICAL MEDICINE & REHABILITATION

## 2019-09-18 PROCEDURE — 99213 OFFICE O/P EST LOW 20 MIN: CPT | Performed by: PHYSICAL MEDICINE & REHABILITATION

## 2019-09-19 ENCOUNTER — TELEPHONE (OUTPATIENT)
Dept: PHYSICAL MEDICINE AND REHAB | Age: 54
End: 2019-09-19

## 2019-10-30 ENCOUNTER — OFFICE VISIT (OUTPATIENT)
Dept: PHYSICAL MEDICINE AND REHAB | Age: 54
End: 2019-10-30
Payer: MEDICARE

## 2019-10-30 VITALS
HEIGHT: 72 IN | TEMPERATURE: 97.9 F | WEIGHT: 195 LBS | DIASTOLIC BLOOD PRESSURE: 84 MMHG | SYSTOLIC BLOOD PRESSURE: 116 MMHG | BODY MASS INDEX: 26.41 KG/M2 | HEART RATE: 77 BPM

## 2019-10-30 DIAGNOSIS — Z78.9 IMPAIRED MOBILITY AND ACTIVITIES OF DAILY LIVING: ICD-10-CM

## 2019-10-30 DIAGNOSIS — Z74.09 IMPAIRED MOBILITY AND ACTIVITIES OF DAILY LIVING: ICD-10-CM

## 2019-10-30 DIAGNOSIS — I69.398 SPASTICITY AS LATE EFFECT OF CEREBROVASCULAR ACCIDENT (CVA): Primary | ICD-10-CM

## 2019-10-30 DIAGNOSIS — R25.2 SPASTICITY AS LATE EFFECT OF CEREBROVASCULAR ACCIDENT (CVA): Primary | ICD-10-CM

## 2019-10-30 PROCEDURE — 95874 GUIDE NERV DESTR NEEDLE EMG: CPT | Performed by: PHYSICAL MEDICINE & REHABILITATION

## 2019-10-30 PROCEDURE — 64645 CHEMODENERV 1 EXTREM 5/> EA: CPT | Performed by: PHYSICAL MEDICINE & REHABILITATION

## 2019-10-30 PROCEDURE — 64644 CHEMODENERV 1 EXTREM 5/> MUS: CPT | Performed by: PHYSICAL MEDICINE & REHABILITATION

## 2019-11-07 ENCOUNTER — EVALUATION (OUTPATIENT)
Dept: OCCUPATIONAL THERAPY | Age: 54
End: 2019-11-07
Payer: MEDICARE

## 2019-11-07 ENCOUNTER — EVALUATION (OUTPATIENT)
Dept: PHYSICAL THERAPY | Age: 54
End: 2019-11-07
Payer: MEDICARE

## 2019-11-07 DIAGNOSIS — I69.398 SPASTICITY AS LATE EFFECT OF CEREBROVASCULAR ACCIDENT (CVA): Primary | ICD-10-CM

## 2019-11-07 DIAGNOSIS — R25.2 SPASTICITY AS LATE EFFECT OF CEREBROVASCULAR ACCIDENT (CVA): Primary | ICD-10-CM

## 2019-11-07 PROCEDURE — 97162 PT EVAL MOD COMPLEX 30 MIN: CPT | Performed by: PHYSICAL THERAPIST

## 2019-11-07 PROCEDURE — 97166 OT EVAL MOD COMPLEX 45 MIN: CPT | Performed by: OCCUPATIONAL THERAPIST

## 2019-11-10 PROBLEM — I69.398 SPASTICITY AS LATE EFFECT OF CEREBROVASCULAR ACCIDENT (CVA): Status: ACTIVE | Noted: 2019-11-10

## 2019-11-10 PROBLEM — R25.2 SPASTICITY AS LATE EFFECT OF CEREBROVASCULAR ACCIDENT (CVA): Status: ACTIVE | Noted: 2019-11-10

## 2019-11-12 ENCOUNTER — TELEPHONE (OUTPATIENT)
Dept: PHYSICAL THERAPY | Age: 54
End: 2019-11-12

## 2019-11-14 ENCOUNTER — TREATMENT (OUTPATIENT)
Dept: OCCUPATIONAL THERAPY | Age: 54
End: 2019-11-14
Payer: MEDICARE

## 2019-11-14 ENCOUNTER — TREATMENT (OUTPATIENT)
Dept: PHYSICAL THERAPY | Age: 54
End: 2019-11-14
Payer: MEDICARE

## 2019-11-14 DIAGNOSIS — I69.30 HISTORY OF CEREBROVASCULAR ACCIDENT (CVA) WITH RESIDUAL DEFICIT: Primary | ICD-10-CM

## 2019-11-14 DIAGNOSIS — I69.398 SPASTICITY AS LATE EFFECT OF CEREBROVASCULAR ACCIDENT (CVA): Primary | ICD-10-CM

## 2019-11-14 DIAGNOSIS — R25.2 SPASTICITY AS LATE EFFECT OF CEREBROVASCULAR ACCIDENT (CVA): Primary | ICD-10-CM

## 2019-11-14 PROCEDURE — 97140 MANUAL THERAPY 1/> REGIONS: CPT | Performed by: OCCUPATIONAL THERAPIST

## 2019-11-14 PROCEDURE — 97116 GAIT TRAINING THERAPY: CPT | Performed by: PHYSICAL THERAPIST

## 2019-11-14 PROCEDURE — 97112 NEUROMUSCULAR REEDUCATION: CPT | Performed by: PHYSICAL THERAPIST

## 2019-11-20 ENCOUNTER — TREATMENT (OUTPATIENT)
Dept: OCCUPATIONAL THERAPY | Age: 54
End: 2019-11-20
Payer: MEDICARE

## 2019-11-20 ENCOUNTER — TREATMENT (OUTPATIENT)
Dept: PHYSICAL THERAPY | Age: 54
End: 2019-11-20
Payer: MEDICARE

## 2019-11-20 DIAGNOSIS — I69.398 SPASTICITY AS LATE EFFECT OF CEREBROVASCULAR ACCIDENT (CVA): Primary | ICD-10-CM

## 2019-11-20 DIAGNOSIS — R25.2 SPASTICITY AS LATE EFFECT OF CEREBROVASCULAR ACCIDENT (CVA): Primary | ICD-10-CM

## 2019-11-20 DIAGNOSIS — I69.30 HISTORY OF CEREBROVASCULAR ACCIDENT (CVA) WITH RESIDUAL DEFICIT: Primary | ICD-10-CM

## 2019-11-20 PROCEDURE — 97112 NEUROMUSCULAR REEDUCATION: CPT

## 2019-11-20 PROCEDURE — 97116 GAIT TRAINING THERAPY: CPT

## 2019-11-20 PROCEDURE — 97140 MANUAL THERAPY 1/> REGIONS: CPT | Performed by: OCCUPATIONAL THERAPIST

## 2019-11-20 PROCEDURE — 97110 THERAPEUTIC EXERCISES: CPT | Performed by: OCCUPATIONAL THERAPIST

## 2019-11-22 ENCOUNTER — TREATMENT (OUTPATIENT)
Dept: OCCUPATIONAL THERAPY | Age: 54
End: 2019-11-22
Payer: MEDICARE

## 2019-11-22 ENCOUNTER — TREATMENT (OUTPATIENT)
Dept: PHYSICAL THERAPY | Age: 54
End: 2019-11-22
Payer: MEDICARE

## 2019-11-22 DIAGNOSIS — I69.398 SPASTICITY AS LATE EFFECT OF CEREBROVASCULAR ACCIDENT (CVA): Primary | ICD-10-CM

## 2019-11-22 DIAGNOSIS — R25.2 SPASTICITY AS LATE EFFECT OF CEREBROVASCULAR ACCIDENT (CVA): Primary | ICD-10-CM

## 2019-11-22 DIAGNOSIS — I69.30 HISTORY OF CEREBROVASCULAR ACCIDENT (CVA) WITH RESIDUAL DEFICIT: Primary | ICD-10-CM

## 2019-11-22 PROCEDURE — 97140 MANUAL THERAPY 1/> REGIONS: CPT | Performed by: OCCUPATIONAL THERAPIST

## 2019-11-22 PROCEDURE — 97112 NEUROMUSCULAR REEDUCATION: CPT | Performed by: PHYSICAL THERAPIST

## 2019-11-22 PROCEDURE — 97110 THERAPEUTIC EXERCISES: CPT | Performed by: PHYSICAL THERAPIST

## 2019-11-22 PROCEDURE — 97116 GAIT TRAINING THERAPY: CPT | Performed by: PHYSICAL THERAPIST

## 2019-11-22 PROCEDURE — 97110 THERAPEUTIC EXERCISES: CPT | Performed by: OCCUPATIONAL THERAPIST

## 2019-11-25 ENCOUNTER — TREATMENT (OUTPATIENT)
Dept: PHYSICAL THERAPY | Age: 54
End: 2019-11-25
Payer: MEDICARE

## 2019-11-25 ENCOUNTER — TREATMENT (OUTPATIENT)
Dept: OCCUPATIONAL THERAPY | Age: 54
End: 2019-11-25
Payer: MEDICARE

## 2019-11-25 DIAGNOSIS — I69.398 SPASTICITY AS LATE EFFECT OF CEREBROVASCULAR ACCIDENT (CVA): Primary | ICD-10-CM

## 2019-11-25 DIAGNOSIS — I69.30 HISTORY OF CEREBROVASCULAR ACCIDENT (CVA) WITH RESIDUAL DEFICIT: Primary | ICD-10-CM

## 2019-11-25 DIAGNOSIS — R25.2 SPASTICITY AS LATE EFFECT OF CEREBROVASCULAR ACCIDENT (CVA): Primary | ICD-10-CM

## 2019-11-25 PROCEDURE — 97110 THERAPEUTIC EXERCISES: CPT | Performed by: OCCUPATIONAL THERAPIST

## 2019-11-25 PROCEDURE — 97110 THERAPEUTIC EXERCISES: CPT

## 2019-11-25 PROCEDURE — 97116 GAIT TRAINING THERAPY: CPT

## 2019-11-25 PROCEDURE — 97112 NEUROMUSCULAR REEDUCATION: CPT

## 2019-11-25 PROCEDURE — 97140 MANUAL THERAPY 1/> REGIONS: CPT | Performed by: OCCUPATIONAL THERAPIST

## 2019-12-03 ENCOUNTER — TREATMENT (OUTPATIENT)
Dept: PHYSICAL THERAPY | Age: 54
End: 2019-12-03
Payer: MEDICARE

## 2019-12-03 ENCOUNTER — TREATMENT (OUTPATIENT)
Dept: OCCUPATIONAL THERAPY | Age: 54
End: 2019-12-03
Payer: MEDICARE

## 2019-12-03 DIAGNOSIS — I69.30 HISTORY OF CEREBROVASCULAR ACCIDENT (CVA) WITH RESIDUAL DEFICIT: Primary | ICD-10-CM

## 2019-12-03 DIAGNOSIS — I69.398 SPASTICITY AS LATE EFFECT OF CEREBROVASCULAR ACCIDENT (CVA): Primary | ICD-10-CM

## 2019-12-03 DIAGNOSIS — R25.2 SPASTICITY AS LATE EFFECT OF CEREBROVASCULAR ACCIDENT (CVA): Primary | ICD-10-CM

## 2019-12-03 PROCEDURE — 97140 MANUAL THERAPY 1/> REGIONS: CPT | Performed by: OCCUPATIONAL THERAPY ASSISTANT

## 2019-12-03 PROCEDURE — 97116 GAIT TRAINING THERAPY: CPT

## 2019-12-03 PROCEDURE — 97112 NEUROMUSCULAR REEDUCATION: CPT

## 2019-12-03 PROCEDURE — 97110 THERAPEUTIC EXERCISES: CPT

## 2019-12-03 PROCEDURE — 97110 THERAPEUTIC EXERCISES: CPT | Performed by: OCCUPATIONAL THERAPY ASSISTANT

## 2019-12-05 ENCOUNTER — TELEPHONE (OUTPATIENT)
Dept: PHYSICAL THERAPY | Age: 54
End: 2019-12-05

## 2019-12-10 ENCOUNTER — TREATMENT (OUTPATIENT)
Dept: PHYSICAL THERAPY | Age: 54
End: 2019-12-10
Payer: MEDICARE

## 2019-12-10 ENCOUNTER — TREATMENT (OUTPATIENT)
Dept: OCCUPATIONAL THERAPY | Age: 54
End: 2019-12-10
Payer: MEDICARE

## 2019-12-10 DIAGNOSIS — I69.398 SPASTICITY AS LATE EFFECT OF CEREBROVASCULAR ACCIDENT (CVA): Primary | ICD-10-CM

## 2019-12-10 DIAGNOSIS — R25.2 SPASTICITY AS LATE EFFECT OF CEREBROVASCULAR ACCIDENT (CVA): Primary | ICD-10-CM

## 2019-12-10 DIAGNOSIS — R25.2 SPASTICITY AS LATE EFFECT OF CEREBROVASCULAR ACCIDENT (CVA): ICD-10-CM

## 2019-12-10 DIAGNOSIS — I69.30 HISTORY OF CEREBROVASCULAR ACCIDENT (CVA) WITH RESIDUAL DEFICIT: Primary | ICD-10-CM

## 2019-12-10 DIAGNOSIS — I69.398 SPASTICITY AS LATE EFFECT OF CEREBROVASCULAR ACCIDENT (CVA): ICD-10-CM

## 2019-12-10 PROCEDURE — 97140 MANUAL THERAPY 1/> REGIONS: CPT | Performed by: OCCUPATIONAL THERAPIST

## 2019-12-10 PROCEDURE — 97110 THERAPEUTIC EXERCISES: CPT

## 2019-12-10 PROCEDURE — 97112 NEUROMUSCULAR REEDUCATION: CPT

## 2019-12-10 PROCEDURE — 97116 GAIT TRAINING THERAPY: CPT

## 2019-12-10 PROCEDURE — 97110 THERAPEUTIC EXERCISES: CPT | Performed by: OCCUPATIONAL THERAPIST

## 2019-12-11 ENCOUNTER — OFFICE VISIT (OUTPATIENT)
Dept: PHYSICAL MEDICINE AND REHAB | Age: 54
End: 2019-12-11
Payer: MEDICARE

## 2019-12-11 VITALS
SYSTOLIC BLOOD PRESSURE: 122 MMHG | HEART RATE: 78 BPM | BODY MASS INDEX: 27.5 KG/M2 | WEIGHT: 203 LBS | DIASTOLIC BLOOD PRESSURE: 75 MMHG | HEIGHT: 72 IN

## 2019-12-11 DIAGNOSIS — R25.2 SPASTICITY AS LATE EFFECT OF CEREBROVASCULAR ACCIDENT (CVA): Primary | ICD-10-CM

## 2019-12-11 DIAGNOSIS — I69.398 SPASTICITY AS LATE EFFECT OF CEREBROVASCULAR ACCIDENT (CVA): Primary | ICD-10-CM

## 2019-12-11 DIAGNOSIS — Z74.09 IMPAIRED MOBILITY AND ACTIVITIES OF DAILY LIVING: ICD-10-CM

## 2019-12-11 DIAGNOSIS — Z78.9 IMPAIRED MOBILITY AND ACTIVITIES OF DAILY LIVING: ICD-10-CM

## 2019-12-11 PROCEDURE — G8427 DOCREV CUR MEDS BY ELIG CLIN: HCPCS | Performed by: PHYSICAL MEDICINE & REHABILITATION

## 2019-12-11 PROCEDURE — G8419 CALC BMI OUT NRM PARAM NOF/U: HCPCS | Performed by: PHYSICAL MEDICINE & REHABILITATION

## 2019-12-11 PROCEDURE — 1036F TOBACCO NON-USER: CPT | Performed by: PHYSICAL MEDICINE & REHABILITATION

## 2019-12-11 PROCEDURE — 99213 OFFICE O/P EST LOW 20 MIN: CPT | Performed by: PHYSICAL MEDICINE & REHABILITATION

## 2019-12-11 PROCEDURE — 3017F COLORECTAL CA SCREEN DOC REV: CPT | Performed by: PHYSICAL MEDICINE & REHABILITATION

## 2019-12-11 PROCEDURE — G8484 FLU IMMUNIZE NO ADMIN: HCPCS | Performed by: PHYSICAL MEDICINE & REHABILITATION

## 2019-12-12 ENCOUNTER — TREATMENT (OUTPATIENT)
Dept: OCCUPATIONAL THERAPY | Age: 54
End: 2019-12-12
Payer: MEDICARE

## 2019-12-12 ENCOUNTER — TREATMENT (OUTPATIENT)
Dept: PHYSICAL THERAPY | Age: 54
End: 2019-12-12
Payer: MEDICARE

## 2019-12-12 DIAGNOSIS — I69.398 SPASTICITY AS LATE EFFECT OF CEREBROVASCULAR ACCIDENT (CVA): ICD-10-CM

## 2019-12-12 DIAGNOSIS — R25.2 SPASTICITY AS LATE EFFECT OF CEREBROVASCULAR ACCIDENT (CVA): Primary | ICD-10-CM

## 2019-12-12 DIAGNOSIS — I69.30 HISTORY OF CEREBROVASCULAR ACCIDENT (CVA) WITH RESIDUAL DEFICIT: Primary | ICD-10-CM

## 2019-12-12 DIAGNOSIS — R25.2 SPASTICITY AS LATE EFFECT OF CEREBROVASCULAR ACCIDENT (CVA): ICD-10-CM

## 2019-12-12 DIAGNOSIS — I69.398 SPASTICITY AS LATE EFFECT OF CEREBROVASCULAR ACCIDENT (CVA): Primary | ICD-10-CM

## 2019-12-12 PROCEDURE — 97112 NEUROMUSCULAR REEDUCATION: CPT | Performed by: PHYSICAL THERAPIST

## 2019-12-12 PROCEDURE — 97140 MANUAL THERAPY 1/> REGIONS: CPT | Performed by: OCCUPATIONAL THERAPIST

## 2019-12-12 PROCEDURE — 97110 THERAPEUTIC EXERCISES: CPT | Performed by: OCCUPATIONAL THERAPIST

## 2019-12-12 PROCEDURE — 97116 GAIT TRAINING THERAPY: CPT | Performed by: PHYSICAL THERAPIST

## 2019-12-12 PROCEDURE — 97110 THERAPEUTIC EXERCISES: CPT | Performed by: PHYSICAL THERAPIST

## 2019-12-19 ENCOUNTER — TREATMENT (OUTPATIENT)
Dept: PHYSICAL THERAPY | Age: 54
End: 2019-12-19
Payer: MEDICARE

## 2019-12-19 DIAGNOSIS — I69.30 HISTORY OF CEREBROVASCULAR ACCIDENT (CVA) WITH RESIDUAL DEFICIT: Primary | ICD-10-CM

## 2019-12-19 PROCEDURE — 97116 GAIT TRAINING THERAPY: CPT

## 2019-12-19 PROCEDURE — 97110 THERAPEUTIC EXERCISES: CPT

## 2019-12-19 PROCEDURE — 97112 NEUROMUSCULAR REEDUCATION: CPT

## 2020-01-08 ENCOUNTER — TELEPHONE (OUTPATIENT)
Dept: PHYSICAL MEDICINE AND REHAB | Age: 55
End: 2020-01-08

## 2020-01-08 NOTE — TELEPHONE ENCOUNTER
An approval was obtained from [facility] for Botox 600 unit injections every 12 weeks. The medication will come from [address] [place] [address]. I completed the medication request form and faxed it to [facility]. I also called the patient and discussed this with him. I told him that he should be receiving a call from the pharmacy to go over benefits, possible copays, and to consent for shipment to our office. The patient voiced understanding. He will call our office after he speaks with the pharmacist. I also told him to call if there are any issues.

## 2020-01-20 ENCOUNTER — TELEPHONE (OUTPATIENT)
Dept: PHYSICAL MEDICINE AND REHAB | Age: 55
End: 2020-01-20

## 2020-01-20 NOTE — TELEPHONE ENCOUNTER
I have not heard from [address], nor the patient, regarding the Botox order that was sent on 1/8/20. [facility] Jaswinder. She informed me that there is a note stating they have attempted to reach the patient on 1/11/2020 to go over information and copayment, and are awaiting his returned call. I then called the patient and informed him of this. He said that he had not received a phone call or VM regarding the Botox. I provided the number for the patient to call, and advised that he call the office back to let us know so that we can go ahead and schedule the delivery. He voiced understanding.

## 2020-01-22 NOTE — TELEPHONE ENCOUNTER
Patient called back and he spoke with [address] and they informed him that he would have a $400 copay so he said he cancelled the order. I informed the patient that I would send him information regarding the Botox saving program.  Patient is aware and voiced understanding. Will mail the savings program information.

## 2020-06-16 ENCOUNTER — APPOINTMENT (OUTPATIENT)
Dept: ULTRASOUND IMAGING | Age: 55
End: 2020-06-16
Payer: MEDICARE

## 2020-06-16 ENCOUNTER — HOSPITAL ENCOUNTER (EMERGENCY)
Age: 55
Discharge: HOME | End: 2020-06-16
Attending: EMERGENCY MEDICINE
Payer: MEDICARE

## 2020-06-16 VITALS
SYSTOLIC BLOOD PRESSURE: 117 MMHG | RESPIRATION RATE: 18 BRPM | DIASTOLIC BLOOD PRESSURE: 86 MMHG | WEIGHT: 194 LBS | BODY MASS INDEX: 26.28 KG/M2 | HEIGHT: 72 IN | HEART RATE: 103 BPM | OXYGEN SATURATION: 99 % | TEMPERATURE: 98.3 F

## 2020-06-16 LAB
BILIRUBIN URINE: NEGATIVE
BLOOD, URINE: NEGATIVE
CLARITY: CLEAR
COLOR: YELLOW
GLUCOSE URINE: NEGATIVE MG/DL
KETONES, URINE: ABNORMAL MG/DL
LEUKOCYTE ESTERASE, URINE: NEGATIVE
NITRITE, URINE: NEGATIVE
PH UA: 6 (ref 5–9)
PROTEIN UA: NEGATIVE MG/DL
SPECIFIC GRAVITY UA: >=1.03 (ref 1–1.03)
UROBILINOGEN, URINE: 0.2 E.U./DL

## 2020-06-16 PROCEDURE — 99284 EMERGENCY DEPT VISIT MOD MDM: CPT

## 2020-06-16 PROCEDURE — 6360000002 HC RX W HCPCS: Performed by: EMERGENCY MEDICINE

## 2020-06-16 PROCEDURE — 81003 URINALYSIS AUTO W/O SCOPE: CPT

## 2020-06-16 PROCEDURE — 87591 N.GONORRHOEAE DNA AMP PROB: CPT

## 2020-06-16 PROCEDURE — 6370000000 HC RX 637 (ALT 250 FOR IP): Performed by: EMERGENCY MEDICINE

## 2020-06-16 PROCEDURE — 87491 CHLMYD TRACH DNA AMP PROBE: CPT

## 2020-06-16 PROCEDURE — 93975 VASCULAR STUDY: CPT

## 2020-06-16 PROCEDURE — 76870 US EXAM SCROTUM: CPT

## 2020-06-16 PROCEDURE — 96372 THER/PROPH/DIAG INJ SC/IM: CPT

## 2020-06-16 PROCEDURE — 87088 URINE BACTERIA CULTURE: CPT

## 2020-06-16 RX ORDER — CEFTRIAXONE SODIUM 250 MG/1
250 INJECTION, POWDER, FOR SOLUTION INTRAMUSCULAR; INTRAVENOUS ONCE
Status: COMPLETED | OUTPATIENT
Start: 2020-06-16 | End: 2020-06-16

## 2020-06-16 RX ORDER — AZITHROMYCIN 250 MG/1
1000 TABLET, FILM COATED ORAL ONCE
Status: COMPLETED | OUTPATIENT
Start: 2020-06-16 | End: 2020-06-16

## 2020-06-16 RX ADMIN — CEFTRIAXONE SODIUM 250 MG: 250 INJECTION, POWDER, FOR SOLUTION INTRAMUSCULAR; INTRAVENOUS at 22:12

## 2020-06-16 RX ADMIN — AZITHROMYCIN MONOHYDRATE 1000 MG: 250 TABLET ORAL at 22:11

## 2020-06-16 ASSESSMENT — PAIN DESCRIPTION - DESCRIPTORS: DESCRIPTORS: BURNING

## 2020-06-16 ASSESSMENT — PAIN SCALES - GENERAL: PAINLEVEL_OUTOF10: 3

## 2020-06-16 NOTE — ED NOTES
Bed: 13  Expected date:   Expected time:   Means of arrival:   Comments:     Junaid Molina RN  06/16/20 1950

## 2020-06-16 NOTE — ED NOTES
Pt attempted to collect urine specimen. Pt unable to do so at this time.       Norah Glynn RN  06/16/20 1942

## 2020-06-17 NOTE — ED NOTES
Pt. remains in [address] [facility] [address].      Lillie Mckay RN  06/16/20 4728

## 2020-06-17 NOTE — ED PROVIDER NOTES
HPI:  6/16/20,   Time: 8:12 PM EDT         Bryanna Johnson is a 54 y.o. male presenting to the ED for groin itching, groin swelling, and dysuria, beginning 2 weeks ago. The complaint has been persistent, mild in severity, and worsened by nothing. Patient states that he was seen in the urgent care 2 weeks ago for similar complaints and was diagnosed with tinea cruris and started on ketoconazole cream.  Patient states that he has been applying the cream as directed but the swelling has not resolved. Patient states that he is having some dysuria. He denies any penile discharge. He denies any testicular pain. ROS:   Pertinent positives and negatives are stated within HPI, all other systems reviewed and are negative.  --------------------------------------------- PAST HISTORY ---------------------------------------------  Past Medical History:  has a past medical history of Blindness, Diverticulitis, Hyperlipidemia, Hypertension, and Stroke ([facility] [address] Past Surgical History:  has a past surgical history that includes brain surgery and Cholecystectomy (Nov 2013). Social History:  reports that he has never smoked. He has never used smokeless tobacco. He reports that he does not drink alcohol or use drugs. Family History: family history includes Diabetes in his mother; Other in his mother; Parkinsonism in his father; Thyroid Disease in his father. The patients home medications have been reviewed. Allergies: Patient has no known allergies.     -------------------------------------------------- RESULTS -------------------------------------------------  All laboratory and radiology results have been personally reviewed by myself   LABS:  Results for orders placed or performed during the hospital encounter of 06/16/20   C.trachomatis N.gonorrhoeae DNA, Urine   Result Value Ref Range    Source Urine    Urinalysis   Result Value Ref Range    Color, UA Yellow Straw/Yellow    Clarity, UA Clear Clear    Glucose, Ur Negative Negative mg/dL    Bilirubin Urine Negative Negative    Ketones, Urine TRACE (A) Negative mg/dL    Specific Gravity, UA >=1.030 1.005 - 1.030    Blood, Urine Negative Negative    pH, UA 6.0 5.0 - 9.0    Protein, UA Negative Negative mg/dL    Urobilinogen, Urine 0.2 <2.0 E.U./dL    Nitrite, Urine Negative Negative    Leukocyte Esterase, Urine Negative Negative       RADIOLOGY:  Interpreted by Radiologist.  [address]   Final Result   Unremarkable testicular ultrasound with normal Doppler flow. US DUP ABD PEL RETRO SCROT COMPLETE   Final Result   Unremarkable testicular ultrasound with normal Doppler flow. ------------------------- NURSING NOTES AND VITALS REVIEWED ---------------------------   The nursing notes within the ED encounter and vital signs as below have been reviewed. /86   Pulse 103   Temp 98.3 °F (36.8 °C) (Oral)   Resp 18   Ht 6' (1.829 m)   Wt 194 lb (88 kg)   SpO2 99%   BMI 26.31 kg/m²   Oxygen Saturation Interpretation: Normal      ---------------------------------------------------PHYSICAL EXAM--------------------------------------      Constitutional/General: Alert and oriented x3, well appearing, non toxic in NAD  Head: NC/AT  Eyes: PERRL, EOMI  Mouth: Oropharynx clear, handling secretions, no trismus  Neck: Supple, full ROM, no meningeal signs  Pulmonary: Lungs clear to auscultation bilaterally, no wheezes, rales, or rhonchi. Not in respiratory distress  Cardiovascular:  Regular rate and rhythm, no murmurs, gallops, or rubs. 2+ distal pulses  Abdomen: Soft, non tender, non distended,   Extremities: Moves all extremities x 4. Warm and well perfused  Skin: warm and dry without rash  Genital: No testicular tenderness to palpation. Slight erythema in the inguinal folds. No penile discharge.    Neurologic: GCS 15,  Psych: Normal Affect      ------------------------------ ED COURSE/MEDICAL DECISION MAKING----------------------  Medications azithromycin (ZITHROMAX) tablet 1,000 mg (has no administration in time range)   cefTRIAXone (ROCEPHIN) injection 250 mg (has no administration in time range)         Medical Decision Making:   Vital signs are stable. Patient has no complaints at this time. Ultrasound of the scrotum and testicles was normal.  Urinalysis does not show any signs of infection. Patient requesting to be treated for gonorrhea and chlamydia. Rocephin and azithromycin ordered. As the patient has had no improvement with ketoconazole cream, will change to lotrimin. I instructed the patient to use the medication prescribed as directed, to follow-up with his primary care physician for reevaluation this week, and to return for any worsening symptoms. The patient is agreeable with the plan of care. Counseling: The emergency provider has spoken with the patient and discussed todays results, in addition to providing specific details for the plan of care and counseling regarding the diagnosis and prognosis. Questions are answered at this time and they are agreeable with the plan.      --------------------------------- IMPRESSION AND DISPOSITION ---------------------------------    IMPRESSION  1.  Tinea cruris        DISPOSITION  Disposition: Discharge to home  Patient condition is good                  Katy Richard MD  06/16/20 5436

## 2020-06-19 LAB — URINE CULTURE, ROUTINE: NORMAL

## 2020-06-20 LAB
C. TRACHOMATIS DNA ,URINE: NEGATIVE
N. GONORRHOEAE DNA, URINE: NEGATIVE
SOURCE: NORMAL

## 2022-06-30 ENCOUNTER — HOSPITAL ENCOUNTER (EMERGENCY)
Age: 57
Discharge: HOME | End: 2022-07-01
Attending: EMERGENCY MEDICINE
Payer: MEDICARE

## 2022-06-30 VITALS
OXYGEN SATURATION: 95 % | TEMPERATURE: 97.6 F | WEIGHT: 198 LBS | BODY MASS INDEX: 26.85 KG/M2 | RESPIRATION RATE: 18 BRPM | HEART RATE: 126 BPM | SYSTOLIC BLOOD PRESSURE: 129 MMHG | DIASTOLIC BLOOD PRESSURE: 88 MMHG

## 2022-06-30 DIAGNOSIS — N48.89 BRUISE OF PENIS: Primary | ICD-10-CM

## 2022-06-30 PROCEDURE — 99282 EMERGENCY DEPT VISIT SF MDM: CPT

## 2022-06-30 ASSESSMENT — ENCOUNTER SYMPTOMS
SINUS PRESSURE: 0
SHORTNESS OF BREATH: 0
BACK PAIN: 0
VOMITING: 0
NAUSEA: 0
DIARRHEA: 0
EYE PAIN: 0
SORE THROAT: 0
WHEEZING: 0
EYE REDNESS: 0
COUGH: 0
EYE DISCHARGE: 0
ABDOMINAL PAIN: 0

## 2022-07-01 NOTE — ED PROVIDER NOTES
Patient with history of stroke. He does report he has some decreased sensation to his penis. Yesterday, he noticed a bruise on the glans of his penis. No pain. No dysuria or any other concerns. Today, he was receiving stimulation from his girlfriend and he thought the bruise may have increased in size. Again, no other complaints. No pain. No dysuria. The history is provided by the patient. Illness   The current episode started today. The onset was sudden. The problem occurs continuously. The problem has been unchanged. The problem is mild. Nothing relieves the symptoms. Nothing aggravates the symptoms. Pertinent negatives include no fever, no abdominal pain, no diarrhea, no nausea, no vomiting, no ear pain, no headaches, no sore throat, no cough, no wheezing, no rash, no eye discharge, no eye pain and no eye redness. Review of Systems   Constitutional: Negative for chills and fever. HENT: Negative for ear pain, sinus pressure and sore throat. Eyes: Negative for pain, discharge and redness. Respiratory: Negative for cough, shortness of breath and wheezing. Cardiovascular: Negative for chest pain. Gastrointestinal: Negative for abdominal pain, diarrhea, nausea and vomiting. Genitourinary: Negative for dysuria and frequency. Penile bruise   Musculoskeletal: Negative for arthralgias and back pain. Skin: Negative for rash and wound. Neurological: Negative for weakness and headaches. Hematological: Negative for adenopathy. All other systems reviewed and are negative. Physical Exam  Vitals and nursing note reviewed. Constitutional:       Appearance: He is well-developed. HENT:      Head: Normocephalic and atraumatic. Eyes:      Pupils: Pupils are equal, round, and reactive to light. Cardiovascular:      Rate and Rhythm: Normal rate and regular rhythm. Heart sounds: Normal heart sounds. No murmur heard.       Pulmonary:      Effort: Pulmonary effort is normal. No respiratory distress. Breath sounds: Normal breath sounds. No wheezing or rales. Abdominal:      General: Bowel sounds are normal.      Palpations: Abdomen is soft. Tenderness: There is no abdominal tenderness. There is no guarding or rebound. Genitourinary:     Comments: There is a small area of ecchymosis involving the right side of the glans penis. There is no blistering. It measures less than half a centimeter in size. No other abnormal findings  Musculoskeletal:      Cervical back: Normal range of motion and neck supple. Comments: Contracture of left upper extremity noted   Skin:     General: Skin is warm and dry. Neurological:      Mental Status: He is alert and oriented to person, place, and time. Cranial Nerves: No cranial nerve deficit. Coordination: Coordination normal.          Procedures     MDM       --------------------------------------------- PAST HISTORY ---------------------------------------------  Past Medical History:  has a past medical history of Blindness, Diverticulitis, Hyperlipidemia, Hypertension, and Stroke ([facility] [address] Past Surgical History:  has a past surgical history that includes brain surgery and Cholecystectomy (Nov 2013). Social History:  reports that he has never smoked. He has never used smokeless tobacco. He reports that he does not drink alcohol and does not use drugs. Family History: family history includes Diabetes in his mother; Other in his mother; Parkinsonism in his father; Thyroid Disease in his father. The patients home medications have been reviewed. Allergies: Patient has no known allergies. -------------------------------------------------- RESULTS -------------------------------------------------  Labs:  No results found for this visit on 06/30/22.     Radiology:  No orders to display       ------------------------- NURSING NOTES AND VITALS REVIEWED ---------------------------  Date / Time Roomed: 6/30/2022 11:13 PM  ED Bed Assignment:  15/15    The nursing notes within the ED encounter and vital signs as below have been reviewed. /88   Pulse (!) 126   Temp 97.6 °F (36.4 °C)   Resp 18   Wt 198 lb (89.8 kg)   SpO2 95%   BMI 26.85 kg/m²   Oxygen Saturation Interpretation: Normal      ------------------------------------------ PROGRESS NOTES ------------------------------------------  11:23 PM EDT  I have spoken with the patient and discussed todays results, in addition to providing specific details for the plan of care and counseling regarding the diagnosis and prognosis. Their questions are answered at this time and they are agreeable with the plan. I discussed at length with them reasons for immediate return here for re evaluation. They will followup with their primary care physician by calling their office tomorrow. --------------------------------- ADDITIONAL PROVIDER NOTES ---------------------------------  At this time the patient is without objective evidence of an acute process requiring hospitalization or inpatient management. They have remained hemodynamically stable throughout their entire ED visit and are stable for discharge with outpatient follow-up. The plan has been discussed in detail and they are aware of the specific conditions for emergent return, as well as the importance of follow-up. New Prescriptions    No medications on file       Diagnosis:  1. Bruise of penis        Disposition:  Patient's disposition: Discharge to home  Patient's condition is stable.         Pina Solo DO  06/30/22 2618

## 2023-06-19 ENCOUNTER — APPOINTMENT (OUTPATIENT)
Dept: CT IMAGING | Age: 58
End: 2023-06-19
Payer: MEDICARE

## 2023-06-19 ENCOUNTER — HOSPITAL ENCOUNTER (EMERGENCY)
Age: 58
Discharge: HOME | End: 2023-06-19
Attending: EMERGENCY MEDICINE
Payer: MEDICARE

## 2023-06-19 VITALS
HEIGHT: 72 IN | BODY MASS INDEX: 27.09 KG/M2 | HEART RATE: 98 BPM | RESPIRATION RATE: 18 BRPM | TEMPERATURE: 98.2 F | WEIGHT: 200 LBS | OXYGEN SATURATION: 98 % | DIASTOLIC BLOOD PRESSURE: 87 MMHG | SYSTOLIC BLOOD PRESSURE: 146 MMHG
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
## 2023-06-19 DIAGNOSIS — M54.50 LUMBAR BACK PAIN: Primary | ICD-10-CM

## 2023-06-19 PROCEDURE — 99284 EMERGENCY DEPT VISIT MOD MDM: CPT

## 2023-06-19 PROCEDURE — 72131 CT LUMBAR SPINE W/O DYE: CPT

## 2023-06-19 ASSESSMENT — PAIN DESCRIPTION - DESCRIPTORS: DESCRIPTORS: ACHING

## 2023-06-19 ASSESSMENT — PAIN DESCRIPTION - LOCATION: LOCATION: BACK

## 2023-06-19 ASSESSMENT — PAIN - FUNCTIONAL ASSESSMENT: PAIN_FUNCTIONAL_ASSESSMENT: 0-10

## 2023-06-19 ASSESSMENT — PAIN DESCRIPTION - PAIN TYPE: TYPE: ACUTE PAIN

## 2023-06-19 ASSESSMENT — PAIN DESCRIPTION - ORIENTATION: ORIENTATION: LOWER

## 2023-06-19 ASSESSMENT — PAIN SCALES - GENERAL: PAINLEVEL_OUTOF10: 3

## 2023-06-20 NOTE — ED PROVIDER NOTES
[address]        Pt Name: Angelita Bence  MRN: [id number]  [place] 1965  Date of evaluation: 6/19/2023  Provider: Dorina Pinto MD  PCP: Berhane Ghosh JR  Note Started: 8:50 PM EDT 6/19/23    CHIEF COMPLAINT       Chief Complaint   Patient presents with    Pennie [place] from standing position today, c/o low back pain       HISTORY OF PRESENT ILLNESS: 1 or more Elements   History From: patient    Limitations to history : None    Angelita Bence is a 62 y.o. male who presents for fall while at home today. 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 He states today he was cooking and and fell due to the lip of the carpet being elevated. He states he fell onto his buttocks and endorses lower back pain bilaterally. Denies head trauma. Denies blood thinner use or loss of consciousness. Denies radiation of the pain down his legs. He states he did poop on himself a little bit at the time of the fall however has not had any other bowel or bladder dysfunction. He states he has no sensation to the left leg due to his prior stroke however no sensory deficit to the right saddle region. He has no other complaints such as chest pain, shortness of breath, abdominal pain, headache, fever. He states he took pain medication at home and at this time his pain is a 2-3/10. The patient's son helped him up onto the couch after the accident and he has not tried to ambulate since. Nursing Notes were all reviewed and agreed with or any disagreements were addressed in the HPI. REVIEW OF EXTERNAL NOTE :       He was last seen in the ED on 6/30/2022 for bruise of penis    REVIEW OF SYSTEMS :           Positives and Pertinent negatives as per HPI.      SURGICAL HISTORY     Past Surgical History:   Procedure Laterality Date    BRAIN SURGERY      removed part of bone    CHOLECYSTECTOMY  Nov 2013

## 2023-06-20 NOTE — ED NOTES
Ambulated with cane 15 feet without assist.  Tolerated well.      Johnny Shelton RN  06/19/23 2135 Colchicine Counseling:  Patient counseled regarding adverse effects including but not limited to stomach upset (nausea, vomiting, stomach pain, or diarrhea).  Patient instructed to limit alcohol consumption while taking this medication.  Colchicine may reduce blood counts especially with prolonged use.  The patient understands that monitoring of kidney function and blood counts may be required, especially at baseline. The patient verbalized understanding of the proper use and possible adverse effects of colchicine.  All of the patient's questions and concerns were addressed.